# Patient Record
Sex: FEMALE | Race: WHITE | Employment: OTHER | URBAN - METROPOLITAN AREA
[De-identification: names, ages, dates, MRNs, and addresses within clinical notes are randomized per-mention and may not be internally consistent; named-entity substitution may affect disease eponyms.]

---

## 2017-05-03 ENCOUNTER — OFFICE VISIT (OUTPATIENT)
Dept: INTERNAL MEDICINE CLINIC | Age: 32
End: 2017-05-03

## 2017-05-03 VITALS
SYSTOLIC BLOOD PRESSURE: 118 MMHG | HEIGHT: 65 IN | RESPIRATION RATE: 16 BRPM | WEIGHT: 174.6 LBS | HEART RATE: 87 BPM | TEMPERATURE: 98.2 F | DIASTOLIC BLOOD PRESSURE: 86 MMHG | BODY MASS INDEX: 29.09 KG/M2 | OXYGEN SATURATION: 98 %

## 2017-05-03 DIAGNOSIS — M62.838 MUSCLE SPASM: ICD-10-CM

## 2017-05-03 DIAGNOSIS — M79.18 PAIN IN RIGHT BUTTOCK: Primary | ICD-10-CM

## 2017-05-03 RX ORDER — PREDNISONE 20 MG/1
TABLET ORAL
Qty: 10 TAB | Refills: 0 | Status: SHIPPED | OUTPATIENT
Start: 2017-05-03 | End: 2017-06-30 | Stop reason: ALTCHOICE

## 2017-05-03 RX ORDER — CYCLOBENZAPRINE HCL 10 MG
10 TABLET ORAL
Qty: 10 TAB | Refills: 0 | Status: SHIPPED | OUTPATIENT
Start: 2017-05-03 | End: 2017-05-13

## 2017-05-03 NOTE — PROGRESS NOTES
Chief Complaint   Patient presents with    Buttocks pain     states that the pain came from out of nowhere and she is going on a trip Saturday. states that it is constant starting in right buttock and goes down the right leg.

## 2017-05-03 NOTE — MR AVS SNAPSHOT
Visit Information Date & Time Provider Department Dept. Phone Encounter #  
 5/3/2017 10:30 AM Ramos Tucker MD [facility] Internal Medicine [phone] [id number] Upcoming Health Maintenance Date Due DTaP/Tdap/Td series (1 - Tdap) 12/29/2008 PAP AKA CERVICAL CYTOLOGY 7/24/2017 INFLUENZA AGE 9 TO ADULT 8/1/2017 Allergies as of 5/3/2017  Review Complete On: 5/3/2017 By: Marely Francis LPN Severity Noted Reaction Type Reactions Sulfa (Sulfonamide Antibiotics) Medium 08/11/2011   Systemic Hives Coconut  10/20/2011    Hives Current Immunizations  Reviewed on 3/5/2013 Name Date HPV (Quad) 3/5/2013 Human Papillomavirus 11/6/2012, 9/6/2012 Not reviewed this visit You Were Diagnosed With   
  
 Codes Comments Pain in right buttock    -  Primary ICD-10-CM: M79.1 ICD-9-CM: 729.1 Muscle spasm     ICD-10-CM: U03.363 ICD-9-CM: 728.85 Vitals BP Pulse Temp Resp Height(growth percentile) Weight(growth percentile) 118/86 (BP 1 Location: Right arm, BP Patient Position: Sitting) 87 98.2 °F (36.8 °C) (Oral) 16 5' 5\" (1.651 m) 174 lb 9.6 oz (79.2 kg) LMP SpO2 BMI OB Status Smoking Status 04/24/2017 98% 29.05 kg/m2 Having regular periods Former Smoker BMI and BSA Data Body Mass Index Body Surface Area 29.05 kg/m 2 1.91 m 2 Preferred Pharmacy Pharmacy Name Phone [facility] [address] [facility], [address] [facility] AT [address] [place] [address] [phone] Your Updated Medication List  
  
   
This list is accurate as of: 5/3/17 11:15 AM.  Always use your most recent med list.  
  
  
  
  
 cyclobenzaprine 10 mg tablet Commonly known as:  FLEXERIL Take 1 Tab by mouth nightly for 10 days. MULTIVITAMIN PO Take  by mouth.  
  
 predniSONE 20 mg tablet Commonly known as:  Laura Duane Prednisone 60 mg po x 1 days, 40 mg pox 2 days, 20 mg pox 2 days, 10 mg po x 1 day then stop. VITAMIN D3 2,000 unit Tab Generic drug:  cholecalciferol (vitamin D3) Take 1 Tab by mouth daily. Prescriptions Sent to Pharmacy Refills  
 predniSONE (DELTASONE) 20 mg tablet 0 Sig: Prednisone 60 mg po x 1 days, 40 mg pox 2 days, 20 mg pox 2 days, 10 mg po x 1 day then stop. Class: Normal  
 Pharmacy: [facility] [address] AT [address] [facility] Ph #: [phone]  
 cyclobenzaprine (FLEXERIL) 10 mg tablet 0 Sig: Take 1 Tab by mouth nightly for 10 days. Class: Normal  
 Pharmacy: [facility] [address] [facility], [address] [facility] [address] [place] [address] Ph #: [phone] Route: Oral  
  
Introducing \[facility]\"" & [facility] SERVICES! Dear Bin Fitting: Thank you for requesting a [employer] account. Our records indicate that you already have an active [employer] account. You can access your account anytime at [email or web address] Did you know that you can access your hospital and ER discharge instructions at any time in [employer]? You can also review all of your test results from your hospital stay or ER visit. Additional Information If you have questions, please visit the Frequently Asked Questions section of the [employer] website at [email or web address] Remember, [employer] is NOT to be used for urgent needs. For medical emergencies, dial 911. Now available from your iPhone and Android! Please provide this summary of care documentation to your next provider. Your primary care clinician is listed as Madalyn Boone. If you have any questions after today's visit, please call [phone].

## 2017-05-03 NOTE — PROGRESS NOTES
Written by Mardene Apgar, as dictated by Dr. Yasmine Cueva MD.    Sher Duarte is a 36 y.o. female. HPI  The patient comes in today C/O R buttock pain that shoots down her R leg x 10 days. She has pain when she bends over and she sits for longer periods of time. Her pain is the worst first thing in the morning. She has taken advil yesterday for this pain, but it did not help at all. She is alternating ice and heat on her buttock, which helps some. She is not sure how she hurt it. She did go fly-fishing 2 weeks ago and has noticed pain since. She is exercising so she is unsure if she hurt it when she was exercising. The pain is getting worse. She is getting concerned because she is going to [facility] next week  for her anniversary. Patient Active Problem List   Diagnosis Code    Groin cyst [id number]        Current Outpatient Prescriptions on File Prior to Visit   Medication Sig Dispense Refill    cholecalciferol, vitamin D3, (VITAMIN D3) 2,000 unit Tab Take 1 Tab by mouth daily.  MULTIVITAMIN PO Take  by mouth. No current facility-administered medications on file prior to visit.         Allergies   Allergen Reactions    Sulfa (Sulfonamide Antibiotics) Hives    Coconut Hives       Past Medical History:   Diagnosis Date    Asthma 10/20/11    CHILDHOOD; NO INHALER IN YEARS       Past Surgical History:   Procedure Laterality Date    HX CYST REMOVAL  2010    inner right thigh    HX CYST REMOVAL  10/2011    HX CYST REMOVAL  13    Excision recurrent cyst, right groin    HX OTHER SURGICAL  11/10    EXCISION R GROIN CYST X2       Family History   Problem Relation Age of Onset    Asthma Mother     Hypertension Father     Diabetes Maternal Grandfather     Diabetes Paternal Grandmother     Cancer Paternal Grandfather      BRAIN TUMOR    Cancer Other      FGR GRANDFATHER-LUNG CA    Heart Disease Neg Hx     Stroke Neg Hx     Malignant Hyperthermia Neg Hx     Pseudocholinesterase Deficiency Neg Hx     Delayed Awakening Neg Hx     Post-op Nausea/Vomiting Neg Hx        Social History     Social History    Marital status: SINGLE     Spouse name: N/A    Number of children: N/A    Years of education: N/A     Occupational History    Not on file. Social History Main Topics    Smoking status: Former Smoker     Packs/day: 0.25     Years: 0.50     Quit date: 8/11/2011    Smokeless tobacco: Never Used    Alcohol use Yes      Comment: one time a week    Drug use: No    Sexual activity: Yes     Partners: Male     Birth control/ protection: Pill     Other Topics Concern    Not on file     Social History Narrative           Review of Systems   Constitutional: Negative for malaise/fatigue. HENT: Negative for congestion. Respiratory: Negative for cough and wheezing. Cardiovascular: Negative for chest pain and palpitations. Musculoskeletal: Negative for joint pain and myalgias. Neurological: Negative for weakness and headaches. Visit Vitals    /86 (BP 1 Location: Right arm, BP Patient Position: Sitting)    Pulse 87    Temp 98.2 °F (36.8 °C) (Oral)    Resp 16    Ht 5' 5\" (1.651 m)    Wt 174 lb 9.6 oz (79.2 kg)    LMP 04/24/2017    SpO2 98%    BMI 29.05 kg/m2     Physical Exam   Constitutional: She is oriented to person, place, and time. She appears well-nourished. No distress. HENT:   Right Ear: External ear normal.   Left Ear: External ear normal.   Mouth/Throat: Oropharynx is clear and moist.   Eyes: Conjunctivae and EOM are normal.   Neck: Normal range of motion. Neck supple. Cardiovascular: Normal rate and regular rhythm. Pulmonary/Chest: Effort normal and breath sounds normal. She has no wheezes. Abdominal: Soft. Bowel sounds are normal.   Musculoskeletal:   R buttock tenderness, ROM painful on adduction  SLRT negative   Neurological: She is alert and oriented to person, place, and time.    Skin: Skin is intact. Psychiatric: She has a normal mood and affect. Nursing note and vitals reviewed. ASSESSMENT and PLAN    ICD-10-CM ICD-9-CM    1. Pain in right buttock M79.1 729.1 predniSONE (DELTASONE) 20 mg tablet sent to pharmacy      cyclobenzaprine (FLEXERIL) 10 mg tablet sent to pharmacy    I discussed that I think she sprained her buttock. I will start her on steroids. I want the patient to take the medication po as follows: 3 pills x 1 day, 2 pills x 2 days, 1 pill x 2 days and 1/2 pill x 1 day. The patient was advised to take this medication with food. 2. Muscle spasm M62.838 728. 85 predniSONE (DELTASONE) 20 mg tablet sent to pharmacy. cyclobenzaprine (FLEXERIL) 10 mg tablet sent to pharmacy. I will also start her on flexeril at night to help with her spasm. If this does not help, then I will order imaging and PT. This plan was reviewed with the patient and patient agrees. All questions were answered. This scribe documentation was reviewed by me and accurately reflects the examination and decisions made by me. This note will not be viewable in [address]

## 2017-06-30 ENCOUNTER — OFFICE VISIT (OUTPATIENT)
Dept: INTERNAL MEDICINE CLINIC | Age: 32
End: 2017-06-30

## 2017-06-30 VITALS
TEMPERATURE: 98.4 F | DIASTOLIC BLOOD PRESSURE: 74 MMHG | BODY MASS INDEX: 29.82 KG/M2 | HEART RATE: 83 BPM | WEIGHT: 179 LBS | RESPIRATION RATE: 14 BRPM | OXYGEN SATURATION: 99 % | SYSTOLIC BLOOD PRESSURE: 116 MMHG | HEIGHT: 65 IN

## 2017-06-30 DIAGNOSIS — L08.9 INFECTED SEBACEOUS CYST: Primary | ICD-10-CM

## 2017-06-30 DIAGNOSIS — L72.3 INFECTED SEBACEOUS CYST: Primary | ICD-10-CM

## 2017-06-30 RX ORDER — CLINDAMYCIN HYDROCHLORIDE 300 MG/1
300 CAPSULE ORAL 3 TIMES DAILY
Qty: 21 CAP | Refills: 0 | Status: SHIPPED | OUTPATIENT
Start: 2017-06-30 | End: 2017-07-07

## 2017-07-24 ENCOUNTER — OFFICE VISIT (OUTPATIENT)
Dept: SURGERY | Age: 32
End: 2017-07-24

## 2017-07-24 VITALS
WEIGHT: 174 LBS | SYSTOLIC BLOOD PRESSURE: 110 MMHG | OXYGEN SATURATION: 97 % | HEIGHT: 65 IN | DIASTOLIC BLOOD PRESSURE: 80 MMHG | RESPIRATION RATE: 18 BRPM | BODY MASS INDEX: 28.99 KG/M2 | HEART RATE: 77 BPM | TEMPERATURE: 98.5 F

## 2017-07-24 DIAGNOSIS — R10.31 RIGHT GROIN PAIN: Primary | ICD-10-CM

## 2017-07-24 RX ORDER — AMOXICILLIN AND CLAVULANATE POTASSIUM 875; 125 MG/1; MG/1
1 TABLET, FILM COATED ORAL 2 TIMES DAILY
Qty: 10 TAB | Refills: 0 | Status: SHIPPED | OUTPATIENT
Start: 2017-07-24 | End: 2017-07-29

## 2017-07-24 NOTE — PROGRESS NOTES
1. Have you been to the ER, urgent care clinic since your last visit? Hospitalized since your last visit? Yes,[place] teeth out.7/11/17. 2. Have you seen or consulted any other health care providers outside of the [address] [place] [address] since your last visit? Include any pap smears or colon screening.  no

## 2017-07-24 NOTE — MR AVS SNAPSHOT
Visit Information Date & Time Provider Department Dept. Phone Encounter #  
 7/24/2017 11:40 AM MD Rj Sutherland [phone] [id number] Upcoming Health Maintenance Date Due DTaP/Tdap/Td series (1 - Tdap) 12/29/2008 PAP AKA CERVICAL CYTOLOGY 7/24/2017 INFLUENZA AGE 9 TO ADULT 8/1/2017 Allergies as of 7/24/2017  Review Complete On: 7/24/2017 By: Zora David LPN Severity Noted Reaction Type Reactions Sulfa (Sulfonamide Antibiotics) Medium 08/11/2011   Systemic Hives Coconut  10/20/2011    Hives Current Immunizations  Reviewed on 3/5/2013 Name Date HPV (Quad) 3/5/2013 Human Papillomavirus 11/6/2012, 9/6/2012 Not reviewed this visit Vitals BP Pulse Temp Resp Height(growth percentile) Weight(growth percentile) 110/80 77 98.5 °F (36.9 °C) (Oral) 18 5' 5\" (1.651 m) 174 lb (78.9 kg) LMP SpO2 BMI OB Status Smoking Status (LMP Unknown) 97% 28.96 kg/m2 IUD Former Smoker BMI and BSA Data Body Mass Index Body Surface Area  
 28.96 kg/m 2 1.9 m 2 Preferred Pharmacy Pharmacy Name Phone [facility] [address] [facility], [address] [place] [address] AT [address] [place] [address] [phone] Your Updated Medication List  
  
   
This list is accurate as of: 7/24/17 12:08 PM.  Always use your most recent med list.  
  
  
  
  
 MIRENA 20 mcg/24 hr (5 years) IUD Generic drug:  levonorgestrel 1 Each by IntraUTERine route once. Due for removal 3/2022 MULTIVITAMIN PO Take  by mouth. VITAMIN D3 2,000 unit Tab Generic drug:  cholecalciferol (vitamin D3) Take 1 Tab by mouth daily. Introducing [facility] & HEALTH SERVICES! Dear Dori Rendon: Thank you for requesting a [employer] account. Our records indicate that you already have an active [employer] account. You can access your account anytime at [email or web address] Did you know that you can access your hospital and ER discharge instructions at any time in [employer]? You can also review all of your test results from your hospital stay or ER visit. Additional Information If you have questions, please visit the Frequently Asked Questions section of the [employer] website at [email or web address] Remember, [employer] is NOT to be used for urgent needs. For medical emergencies, dial 911. Now available from your iPhone and Android! Please provide this summary of care documentation to your next provider. Your primary care clinician is listed as Pam Oliveira. If you have any questions after today's visit, please call [phone].

## 2017-07-24 NOTE — PROGRESS NOTES
HISTORY OF PRESENT ILLNESS  Tova Van is a 36 y.o. female who returns for evaluation of right groin pain. HPI Comments: Ms. Preeti Bar tells me that she began experiencing pain in her right groin approx. 3 weeks ago. She subsequently developed a mass which has become larger and more bothersome to her. No associated drainage. Of note, Ms. Camacho is s/p excision of a chronic wound from her right groin in 8/2013 - the pain and mass are localized to the same area. She has otherwise been in her usual state of health. Past Medical History:  10/20/11: Asthma      Comment: CHILDHOOD; NO INHALER IN YEARS  7/24/2017: Right groin pain    Past Surgical History:  11/01/2010: HX CYST REMOVAL      Comment: inner right thigh  10/2011: HX CYST REMOVAL  8/16/13: HX CYST REMOVAL      Comment: Excision recurrent cyst, right groin  11/10: HX OTHER SURGICAL      Comment: EXCISION R GROIN CYST X2  07/11/2017: HX WISDOM TEETH EXTRACTION    Review of patient's family history indicates:    Asthma                         Mother                    Hypertension                   Father                    Diabetes                       Maternal Grandfather      Diabetes                       Paternal Grandmother      Cancer                         Paternal Grandfather        Comment: BRAIN TUMOR    Cancer                         Other                       Comment: FGR GRANDFATHER-LUNG CA    Heart Disease                  Neg Hx                    Stroke                         Neg Hx                    Malignant Hyperthermia         Neg Hx                    Pseudocholinesterase Deficien* Neg Hx                    Delayed Awakening              Neg Hx                    Post-op Nausea/Vomiting        Neg Hx                    Social History: Employment - Self Employed. Tobacco - Quit. EtOH - 2-4 drinks per week. Review of systems negative except as noted.       Review of Systems   Constitutional: Negative for chills and fever. Gastrointestinal: Negative for nausea and vomiting. Musculoskeletal:        Pain in right groin. Physical Exam   Constitutional: She appears well-developed and well-nourished. No distress. HENT:   Head: Normocephalic and atraumatic. Eyes: No scleral icterus. Neck: Neck supple. Cardiovascular: Normal rate and regular rhythm. Pulmonary/Chest: Effort normal and breath sounds normal.   Abdominal: Soft. She exhibits no distension. There is no tenderness. There is no rebound and no guarding. Musculoskeletal: Normal range of motion. Lymphadenopathy:     She has no cervical adenopathy. Neurological: She is alert. Skin:   Well healed scar in right groin. At the lateral aspect of the scar there is a tender area. Minimal erythema. No associated drainage. Vitals reviewed. ASSESSMENT and PLAN  At this point in time, do not believe that there is an indication for surgical intervention. Suggested that she try warm compresses to the area. Also, Rx for Augmentin 875mg BID for 5 days. Will see in one more week or earlier if need be. She is agreeable to this plan and is most certainly free to contact the office should any questions or concerns arise.      CC: Juan Hooper MD

## 2017-08-07 ENCOUNTER — OFFICE VISIT (OUTPATIENT)
Dept: SURGERY | Age: 32
End: 2017-08-07

## 2017-08-07 VITALS
WEIGHT: 176.5 LBS | DIASTOLIC BLOOD PRESSURE: 70 MMHG | TEMPERATURE: 98.7 F | RESPIRATION RATE: 20 BRPM | SYSTOLIC BLOOD PRESSURE: 112 MMHG | BODY MASS INDEX: 29.41 KG/M2 | HEIGHT: 65 IN | HEART RATE: 88 BPM | OXYGEN SATURATION: 98 %

## 2017-08-07 DIAGNOSIS — L98.9 SKIN LESION OF RIGHT LOWER EXTREMITY: Primary | ICD-10-CM

## 2017-08-07 NOTE — PROGRESS NOTES
Chief Complaint   Patient presents with    Groin Pain     Right-followup     Patient remains with right groin pain, completed course of  Augmentin 875 mg BID x5 days, she has been doing warm compresses as instructed. 1. Have you been to the ER, urgent care clinic since your last visit? Hospitalized since your last visit? No     2. Have you seen or consulted any other health care providers outside of the [address] [place] [address] since your last visit? Include any pap smears or colon screening.  No

## 2017-08-07 NOTE — PROGRESS NOTES
HISTORY OF PRESENT ILLNESS  Nehemiah Collins is a 36 y.o. female who returns for follow up of right groin pain. HPI Comments: Ms. Keri Cordova was seen on 7/24/2017 for evaluation of right groin pain and an associated area of tenderness. She was prescribed a 5 day course of Augmentin 875mg BID. No significant improvement. No new complaints today. She has otherwise been in her usual state of health.      Past Medical History:  10/20/11: Asthma      Comment: CHILDHOOD; NO INHALER IN YEARS  7/24/2017: Right groin pain  8/7/2017: Skin lesion of right lower extremity    Past Surgical History:  11/01/2010: HX CYST REMOVAL      Comment: inner right thigh  10/2011: HX CYST REMOVAL  8/16/13: HX CYST REMOVAL      Comment: Excision recurrent cyst, right groin  11/10: HX OTHER SURGICAL      Comment: EXCISION R GROIN CYST X2  07/11/2017: HX WISDOM TEETH EXTRACTION    Review of patient's family history indicates:    Asthma                         Mother                    Hypertension                   Father                    Diabetes                       Maternal Grandfather      Diabetes                       Paternal Grandmother      Cancer                         Paternal Grandfather        Comment: BRAIN TUMOR    Cancer                         Other                       Comment: FGR GRANDFATHER-LUNG CA    Heart Disease                  Neg Hx                    Stroke                         Neg Hx                    Malignant Hyperthermia         Neg Hx                    Pseudocholinesterase Deficien* Neg Hx                    Delayed Awakening              Neg Hx                    Post-op Nausea/Vomiting        Neg Hx                    Social History    Marital status: SINGLE              Spouse name:                       Years of education:                 Number of children:               Social History Main Topics    Smoking status: Former Smoker Packs/day: 0.25      Years: 0.50           Quit date: 8/11/2011    Smokeless status: Never Used                        Alcohol use: Yes                Comment: one time a week    Drug use: No              Sexual activity: Yes               Partners with: Male       Birth control/protection: Pill    Review of systems negative except as noted. Review of Systems   Constitutional: Negative for chills and fever. Gastrointestinal: Negative for nausea and vomiting. Musculoskeletal:        Right groin pain. Physical Exam   Constitutional: She appears well-developed and well-nourished. No distress. Cardiovascular: Normal rate and regular rhythm. Pulmonary/Chest: Effort normal and breath sounds normal.   Abdominal: Soft. She exhibits no distension. There is no tenderness. Musculoskeletal: Normal range of motion. Neurological: She is alert. Skin:   Well healed scar right groin. At lateral aspect of scar there is a raised skin lesion. Associated tenderness. No drainage. Vitals reviewed. ASSESSMENT and PLAN  In view of the findings on H and P, Ms. Camacho should benefit from excision of the right groin skin lesion as it is bothersome to her. Discussed procedure with her including risks of bleeding, infection, need for further surgery, recurrence. She understands and wishes to proceed. I have tentatively scheduled Ms. Camacho for surgery on August 23, 2017 at [facility] and will see her back in the office postoperatively. She is agreeable to this plan of action and is most certainly free to contact the office should any questions or concerns arise.        CC: Antony Thomas MD

## 2017-08-15 ENCOUNTER — APPOINTMENT (OUTPATIENT)
Dept: PHYSICAL THERAPY | Age: 32
End: 2017-08-15

## 2017-08-16 RX ORDER — BUPIVACAINE HYDROCHLORIDE 2.5 MG/ML
30 INJECTION, SOLUTION EPIDURAL; INFILTRATION; INTRACAUDAL ONCE
Status: CANCELLED | OUTPATIENT
Start: 2017-08-16 | End: 2017-08-16

## 2017-08-22 ENCOUNTER — ANESTHESIA EVENT (OUTPATIENT)
Dept: MEDSURG UNIT | Age: 32
End: 2017-08-22
Payer: COMMERCIAL

## 2017-08-23 ENCOUNTER — HOSPITAL ENCOUNTER (OUTPATIENT)
Age: 32
Setting detail: OUTPATIENT SURGERY
Discharge: HOME | End: 2017-08-23
Attending: SURGERY | Admitting: SURGERY
Payer: COMMERCIAL

## 2017-08-23 ENCOUNTER — ANESTHESIA (OUTPATIENT)
Dept: MEDSURG UNIT | Age: 32
End: 2017-08-23
Payer: COMMERCIAL

## 2017-08-23 VITALS
SYSTOLIC BLOOD PRESSURE: 119 MMHG | BODY MASS INDEX: 28.1 KG/M2 | HEIGHT: 65 IN | HEART RATE: 76 BPM | DIASTOLIC BLOOD PRESSURE: 78 MMHG | WEIGHT: 168.65 LBS | OXYGEN SATURATION: 99 % | RESPIRATION RATE: 12 BRPM | TEMPERATURE: 98.4 F

## 2017-08-23 LAB
DAILY QC (YES/NO)?: YES
HCG UR QL: NEGATIVE
HGB BLD-MCNC: 12.5 G/DL (ref 11.5–16)

## 2017-08-23 PROCEDURE — 74011000250 HC RX REV CODE- 250

## 2017-08-23 PROCEDURE — 81025 URINE PREGNANCY TEST: CPT

## 2017-08-23 PROCEDURE — 77030020782 HC GWN BAIR PAWS FLX 3M -B

## 2017-08-23 PROCEDURE — 85018 HEMOGLOBIN: CPT | Performed by: ANESTHESIOLOGY

## 2017-08-23 PROCEDURE — 77030018836 HC SOL IRR NACL ICUM -A: Performed by: SURGERY

## 2017-08-23 PROCEDURE — 77030031139 HC SUT VCRL2 J&J -A: Performed by: SURGERY

## 2017-08-23 PROCEDURE — 74011000250 HC RX REV CODE- 250: Performed by: SURGERY

## 2017-08-23 PROCEDURE — 74011250636 HC RX REV CODE- 250/636

## 2017-08-23 PROCEDURE — 74011250636 HC RX REV CODE- 250/636: Performed by: ANESTHESIOLOGY

## 2017-08-23 PROCEDURE — 77030010509 HC AIRWY LMA MSK TELE -A: Performed by: ANESTHESIOLOGY

## 2017-08-23 PROCEDURE — 77030002933 HC SUT MCRYL J&J -A: Performed by: SURGERY

## 2017-08-23 PROCEDURE — 76210000046 HC AMBSU PH II REC FIRST 0.5 HR: Performed by: SURGERY

## 2017-08-23 PROCEDURE — 77030011640 HC PAD GRND REM COVD -A: Performed by: SURGERY

## 2017-08-23 PROCEDURE — 77030032490 HC SLV COMPR SCD KNE COVD -B: Performed by: SURGERY

## 2017-08-23 PROCEDURE — 88304 TISSUE EXAM BY PATHOLOGIST: CPT | Performed by: SURGERY

## 2017-08-23 PROCEDURE — 76030000001 HC AMB SURG OR TIME 1 TO 1.5: Performed by: SURGERY

## 2017-08-23 PROCEDURE — 76210000035 HC AMBSU PH I REC 1 TO 1.5 HR: Performed by: SURGERY

## 2017-08-23 PROCEDURE — 74011250636 HC RX REV CODE- 250/636: Performed by: SURGERY

## 2017-08-23 PROCEDURE — 77030010507 HC ADH SKN DERMBND J&J -B: Performed by: SURGERY

## 2017-08-23 PROCEDURE — 76060000062 HC AMB SURG ANES 1 TO 1.5 HR: Performed by: SURGERY

## 2017-08-23 RX ORDER — LIDOCAINE HYDROCHLORIDE 20 MG/ML
INJECTION, SOLUTION EPIDURAL; INFILTRATION; INTRACAUDAL; PERINEURAL AS NEEDED
Status: DISCONTINUED | OUTPATIENT
Start: 2017-08-23 | End: 2017-08-23 | Stop reason: HOSPADM

## 2017-08-23 RX ORDER — MIDAZOLAM HYDROCHLORIDE 1 MG/ML
INJECTION, SOLUTION INTRAMUSCULAR; INTRAVENOUS AS NEEDED
Status: DISCONTINUED | OUTPATIENT
Start: 2017-08-23 | End: 2017-08-23 | Stop reason: HOSPADM

## 2017-08-23 RX ORDER — KETOROLAC TROMETHAMINE 30 MG/ML
INJECTION, SOLUTION INTRAMUSCULAR; INTRAVENOUS AS NEEDED
Status: DISCONTINUED | OUTPATIENT
Start: 2017-08-23 | End: 2017-08-23 | Stop reason: HOSPADM

## 2017-08-23 RX ORDER — SODIUM CHLORIDE 0.9 % (FLUSH) 0.9 %
5-10 SYRINGE (ML) INJECTION EVERY 8 HOURS
Status: DISCONTINUED | OUTPATIENT
Start: 2017-08-23 | End: 2017-08-23 | Stop reason: HOSPADM

## 2017-08-23 RX ORDER — PROPOFOL 10 MG/ML
INJECTION, EMULSION INTRAVENOUS AS NEEDED
Status: DISCONTINUED | OUTPATIENT
Start: 2017-08-23 | End: 2017-08-23 | Stop reason: HOSPADM

## 2017-08-23 RX ORDER — ONDANSETRON 2 MG/ML
INJECTION INTRAMUSCULAR; INTRAVENOUS AS NEEDED
Status: DISCONTINUED | OUTPATIENT
Start: 2017-08-23 | End: 2017-08-23 | Stop reason: HOSPADM

## 2017-08-23 RX ORDER — ONDANSETRON 2 MG/ML
4 INJECTION INTRAMUSCULAR; INTRAVENOUS AS NEEDED
Status: DISCONTINUED | OUTPATIENT
Start: 2017-08-23 | End: 2017-08-23 | Stop reason: HOSPADM

## 2017-08-23 RX ORDER — FENTANYL CITRATE 50 UG/ML
25 INJECTION, SOLUTION INTRAMUSCULAR; INTRAVENOUS
Status: DISCONTINUED | OUTPATIENT
Start: 2017-08-23 | End: 2017-08-23 | Stop reason: HOSPADM

## 2017-08-23 RX ORDER — SODIUM CHLORIDE, SODIUM LACTATE, POTASSIUM CHLORIDE, CALCIUM CHLORIDE 600; 310; 30; 20 MG/100ML; MG/100ML; MG/100ML; MG/100ML
INJECTION, SOLUTION INTRAVENOUS
Status: DISCONTINUED | OUTPATIENT
Start: 2017-08-23 | End: 2017-08-23 | Stop reason: HOSPADM

## 2017-08-23 RX ORDER — LIDOCAINE HYDROCHLORIDE 10 MG/ML
0.1 INJECTION, SOLUTION EPIDURAL; INFILTRATION; INTRACAUDAL; PERINEURAL AS NEEDED
Status: DISCONTINUED | OUTPATIENT
Start: 2017-08-23 | End: 2017-08-23 | Stop reason: HOSPADM

## 2017-08-23 RX ORDER — SODIUM CHLORIDE 0.9 % (FLUSH) 0.9 %
5-10 SYRINGE (ML) INJECTION AS NEEDED
Status: DISCONTINUED | OUTPATIENT
Start: 2017-08-23 | End: 2017-08-23 | Stop reason: HOSPADM

## 2017-08-23 RX ORDER — FENTANYL CITRATE 50 UG/ML
INJECTION, SOLUTION INTRAMUSCULAR; INTRAVENOUS AS NEEDED
Status: DISCONTINUED | OUTPATIENT
Start: 2017-08-23 | End: 2017-08-23 | Stop reason: HOSPADM

## 2017-08-23 RX ORDER — HYDROCODONE BITARTRATE AND ACETAMINOPHEN 5; 325 MG/1; MG/1
1 TABLET ORAL
Qty: 6 TAB | Refills: 0 | Status: SHIPPED | OUTPATIENT
Start: 2017-08-23 | End: 2017-09-06 | Stop reason: ALTCHOICE

## 2017-08-23 RX ORDER — CEFAZOLIN SODIUM IN 0.9 % NACL 2 G/50 ML
2 INTRAVENOUS SOLUTION, PIGGYBACK (ML) INTRAVENOUS
Status: COMPLETED | OUTPATIENT
Start: 2017-08-23 | End: 2017-08-23

## 2017-08-23 RX ORDER — MORPHINE SULFATE 10 MG/ML
2 INJECTION, SOLUTION INTRAMUSCULAR; INTRAVENOUS
Status: DISCONTINUED | OUTPATIENT
Start: 2017-08-23 | End: 2017-08-23 | Stop reason: HOSPADM

## 2017-08-23 RX ORDER — BUPIVACAINE HYDROCHLORIDE 2.5 MG/ML
30 INJECTION, SOLUTION EPIDURAL; INFILTRATION; INTRACAUDAL ONCE
Status: COMPLETED | OUTPATIENT
Start: 2017-08-23 | End: 2017-08-23

## 2017-08-23 RX ORDER — DEXAMETHASONE SODIUM PHOSPHATE 4 MG/ML
INJECTION, SOLUTION INTRA-ARTICULAR; INTRALESIONAL; INTRAMUSCULAR; INTRAVENOUS; SOFT TISSUE AS NEEDED
Status: DISCONTINUED | OUTPATIENT
Start: 2017-08-23 | End: 2017-08-23 | Stop reason: HOSPADM

## 2017-08-23 RX ORDER — SODIUM CHLORIDE, SODIUM LACTATE, POTASSIUM CHLORIDE, CALCIUM CHLORIDE 600; 310; 30; 20 MG/100ML; MG/100ML; MG/100ML; MG/100ML
50 INJECTION, SOLUTION INTRAVENOUS CONTINUOUS
Status: DISCONTINUED | OUTPATIENT
Start: 2017-08-23 | End: 2017-08-23 | Stop reason: HOSPADM

## 2017-08-23 RX ORDER — HYDROMORPHONE HYDROCHLORIDE 1 MG/ML
0.2 INJECTION, SOLUTION INTRAMUSCULAR; INTRAVENOUS; SUBCUTANEOUS
Status: DISCONTINUED | OUTPATIENT
Start: 2017-08-23 | End: 2017-08-23 | Stop reason: HOSPADM

## 2017-08-23 RX ADMIN — FENTANYL CITRATE 50 MCG: 50 INJECTION, SOLUTION INTRAMUSCULAR; INTRAVENOUS at 13:33

## 2017-08-23 RX ADMIN — MIDAZOLAM HYDROCHLORIDE 2 MG: 1 INJECTION, SOLUTION INTRAMUSCULAR; INTRAVENOUS at 13:12

## 2017-08-23 RX ADMIN — CEFAZOLIN 2 G: 1 INJECTION, POWDER, FOR SOLUTION INTRAMUSCULAR; INTRAVENOUS; PARENTERAL at 13:25

## 2017-08-23 RX ADMIN — ONDANSETRON 4 MG: 2 INJECTION INTRAMUSCULAR; INTRAVENOUS at 13:21

## 2017-08-23 RX ADMIN — SODIUM CHLORIDE, SODIUM LACTATE, POTASSIUM CHLORIDE, AND CALCIUM CHLORIDE 50 ML/HR: 600; 310; 30; 20 INJECTION, SOLUTION INTRAVENOUS at 11:06

## 2017-08-23 RX ADMIN — KETOROLAC TROMETHAMINE 30 MG: 30 INJECTION, SOLUTION INTRAMUSCULAR; INTRAVENOUS at 13:47

## 2017-08-23 RX ADMIN — DEXAMETHASONE SODIUM PHOSPHATE 4 MG: 4 INJECTION, SOLUTION INTRA-ARTICULAR; INTRALESIONAL; INTRAMUSCULAR; INTRAVENOUS; SOFT TISSUE at 13:21

## 2017-08-23 RX ADMIN — PROPOFOL 200 MG: 10 INJECTION, EMULSION INTRAVENOUS at 13:21

## 2017-08-23 RX ADMIN — SODIUM CHLORIDE, SODIUM LACTATE, POTASSIUM CHLORIDE, CALCIUM CHLORIDE: 600; 310; 30; 20 INJECTION, SOLUTION INTRAVENOUS at 13:17

## 2017-08-23 RX ADMIN — LIDOCAINE HYDROCHLORIDE 30 MG: 20 INJECTION, SOLUTION EPIDURAL; INFILTRATION; INTRACAUDAL; PERINEURAL at 13:21

## 2017-08-23 RX ADMIN — FENTANYL CITRATE 25 MCG: 50 INJECTION, SOLUTION INTRAMUSCULAR; INTRAVENOUS at 13:21

## 2017-08-23 RX ADMIN — FENTANYL CITRATE 25 MCG: 50 INJECTION, SOLUTION INTRAMUSCULAR; INTRAVENOUS at 14:03

## 2017-08-23 NOTE — ANESTHESIA POSTPROCEDURE EVALUATION
Post-Anesthesia Evaluation and Assessment    Patient: Maryse Andrew MRN: [id number]  SSN: [id number]    YOB: 1985  Age: 36 y.o. Sex: female       Cardiovascular Function/Vital Signs  Visit Vitals    /68    Pulse 64    Temp 36.9 °C (98.4 °F)    Resp (!) 6    Ht 5' 5\" (1.651 m)    Wt 76.5 kg (168 lb 10.4 oz)    SpO2 96%    BMI 28.07 kg/m2       Patient is status post general anesthesia for Procedure(s):  EXCISION SKIN LESION RIGHT INNER THIGH. Nausea/Vomiting: None    Postoperative hydration reviewed and adequate. Pain:  Pain Scale 1: Numeric (0 - 10) (08/23/17 1419)  Pain Intensity 1: 0 (08/23/17 1419)   Managed    Neurological Status:   Neuro (WDL): Within Defined Limits (08/23/17 1419)   At baseline    Mental Status and Level of Consciousness: Arousable    Pulmonary Status:   O2 Device: Nasal cannula (08/23/17 1422)   Adequate oxygenation and airway patent    Complications related to anesthesia: None    Post-anesthesia assessment completed.  No concerns    Signed By: Saintclair Gaw, MD     August 23, 2017

## 2017-08-23 NOTE — ANESTHESIA PREPROCEDURE EVALUATION
Anesthetic History   No history of anesthetic complications            Review of Systems / Medical History  Patient summary reviewed, nursing notes reviewed and pertinent labs reviewed    Pulmonary            Asthma : well controlled       Neuro/Psych   Within defined limits           Cardiovascular                  Exercise tolerance: >4 METS     GI/Hepatic/Renal  Within defined limits              Endo/Other  Within defined limits           Other Findings              Physical Exam    Airway  Mallampati: I  TM Distance: > 6 cm  Neck ROM: normal range of motion   Mouth opening: Normal     Cardiovascular    Rhythm: regular  Rate: normal         Dental  No notable dental hx       Pulmonary  Breath sounds clear to auscultation               Abdominal         Other Findings            Anesthetic Plan    ASA: 2  Anesthesia type: general          Induction: Intravenous  Anesthetic plan and risks discussed with: Patient

## 2017-08-23 NOTE — OP NOTES
[address] [facility], [address]   OP NOTE       Name:  Brad Nair   MR#:  [id number]   :  1985   Account #:  [de-identified]    Surgery Date:  2017   Date of Adm:  2017       PREOPERATIVE DIAGNOSIS: Skin lesion, right inner thigh. POSTOPERATIVE DIAGNOSIS: Skin lesion, right inner thigh. PROCEDURES PERFORMED: Skin lesion, right inner thigh. SURGEON: Maria T Borrego. Tejas Zuleta MD.    ANESTHESIA: General via laryngeal mask airway. ESTIMATED BLOOD LOSS: Minimal.    FLUIDS: Crystalloid 700 mL. SPECIMENS REMOVED: Skin lesion, right inner thigh, to Pathology. DRAINS: None. COMPLICATIONS: None. INDICATIONS FOR SURGERY: The patient is a 72-year-old female   with a raised skin lesion on her right inner thigh. This is bothersome to   her and so she is brought to the operating room at this time for   excision. The risks of the procedure, including but not limited to   infection, bleeding, the need for further surgery and recurrence were   discussed in detail with the patient. All were understood and she   wished to proceed. DESCRIPTION OF PROCEDURE: After consent was obtained, the   patient was brought to the operating room where she was placed in the   supine position on the operating room table. Following the induction of   an adequate level of general anesthesia via laryngeal mask airway,   compression devices were placed on both lower extremities. The legs   were frog-legged and the right inner thigh prepped with Betadine and   draped as a sterile field. Local anesthetic was infiltrated and an   elliptical incision encompassing the skin lesion was opened sharply. Subcutaneous bleeders were carefully cauterized. The skin and   subcutaneous tissues were excised, passed off the field and submitted   for histopathologic evaluation. The excised area measured   approximately 2 x 6 cm. Several bleeders were made hemostatic with   the Bovie.  The skin edges were mobilized such that a primary tension-  free closure could be completed. The wound was irrigated again with   saline, inspected and found to be hemostatic. Surgical incision was   closed with interrupted 2-0 Vicryl sutures followed by 4-0 Monocryl   subcuticular suture to the skin. Additional local anesthetic was   infiltrated and pressure held. The incision was dressed with   Dermabond. The patient was returned to the supine position on the   operating room table. She was awakened from her general anesthetic   and the laryngeal mask airway removed. She was transferred from the   operating room table to the stretcher and brought to the recovery room   in stable condition, having tolerated the procedure well. At the   conclusion of the procedure, all sponge counts, instrument counts, and   needle counts were reported as correct x2.         Sarah Haji MD      [facility] / [facility]   D:  08/23/2017   14:22   T:  08/23/2017   15:34   Job #:  [id number]

## 2017-08-23 NOTE — IP AVS SNAPSHOT
[address] 
[phone] Patient: Marylen Ogles MRN: [id number] :1985 You are allergic to the following Allergen Reactions Sulfa (Sulfonamide Antibiotics) Hives Coconut Hives Recent Documentation Height Weight BMI OB Status Smoking Status 1.651 m 76.5 kg 28.07 kg/m2 Unknown Former Smoker Emergency Contacts Name Discharge Info Relation Home Work Mobile SharonreginaMikhail DISCHARGE CAREGIVER [3] Father [15] [phone] Doug Esposito DISCHARGE CAREGIVER [3] Spouse [3] [phone] About your hospitalization You were admitted on:  2017 You last received care in the:  [facility] PACU You were discharged on:  2017 Unit phone number:  [phone] Why you were hospitalized Your primary diagnosis was:  Not on File Providers Seen During Your Hospitalizations Provider Role Specialty Primary office phone Krissy Novak MD Attending Provider General Surgery [phone] Your Primary Care Physician (PCP) Primary Care Physician Office Phone Office Fax 1400 [facility] [address] [place] [address] [phone] Follow-up Information Follow up With Details Comments Contact Info Valeria Cole MD   [facility] [address] [place] [address] 
[phone] Your Appointments 2017  9:40 AM EDT  
POST OP 10 MIN with Michelle Catalan NP  
[facility] 22 406 ([facility]) [address] [place] [address] [facility] [phone] Current Discharge Medication List  
  
START taking these medications Dose & Instructions Dispensing Information Comments Morning Noon Evening Bedtime HYDROcodone-acetaminophen 5-325 mg per tablet Commonly known as:  Genevieve Orellana  
   
 Your last dose was: Your next dose is:    
   
   
 Dose:  1 Tab Take 1 Tab by mouth every four (4) hours as needed for Pain. Max Daily Amount: 6 Tabs. Quantity:  6 Tab Refills:  0 CONTINUE these medications which have NOT CHANGED Dose & Instructions Dispensing Information Comments Morning Noon Evening Bedtime MULTIVITAMIN PO Your last dose was: Your next dose is: Take  by mouth. Refills:  0  
     
   
   
   
  
 TRINESSA LO PO Your last dose was: Your next dose is:    
   
   
 Dose:  1 Tab Take 1 Tab by mouth daily. Refills:  0  
     
   
   
   
  
 VITAMIN D3 2,000 unit Tab Generic drug:  cholecalciferol (vitamin D3) Your last dose was: Your next dose is:    
   
   
 Dose:  1 Tab Take 1 Tab by mouth daily. Refills:  0 Where to Get Your Medications Information on where to get these meds will be given to you by the nurse or doctor. ! Ask your nurse or doctor about these medications HYDROcodone-acetaminophen 5-325 mg per tablet Discharge Instructions Patient Discharge Instructions Nickie Priscilla / [id number] : 1985 Admitted 2017 Discharged: 2017 · It is important that you take the medication exactly as they are prescribed. · Keep your medication in the bottles provided by the pharmacist and keep a list of the medication names, dosages, and times to be taken in your wallet. · Do not take other medications without consulting your doctor. What to do at [facility] Recommended diet: Regular. Recommended activity: No Restrictions. No Driving While Taking [address]. May Take Shower or [place] Roxo. If you experience any of the following symptoms Fevers, Chills, Nausea, Vomitting, Redness or Drainage at Surgical Site(s) or Any Other Questions or Concerns Please Call -  [phone]. Follow-up with Dr. Jorge Villatoro in 10-14 days. Information obtained by : 
I understand that if any problems occur once I am at home I am to contact my physician. I understand and acknowledge receipt of the instructions indicated above. Physician's or R.N.'s Signature                                                                  Date/Time Patient or Representative Signature                                                          Date/Time DISCHARGE SUMMARY from Nurse 1) Toradol was given to you at 2:45 pm. This medication is an anti-inflammatory and is in the same category as Advil/Ibuprofen/Motrin. Do not take any of these medications until after 10:45 pm if needed. 2) Ancef which is an antibiotic was also given to you. Do not take any additional antibiotics if ordered until tomorrow. 2) Do not take any Tylenol while taking your pain pills as each pill contains 325 mg of Tylenol. Maximum amount of Tylenol for an adult should not exceed 4000 mg. Too much Tylenol can damage your liver. The following personal items are in your possession at time of discharge: 
 
Dental Appliances: None Visual Aid: None Jewelry: Body Piercing (lt ear-taped) Clothing:  (belongings bag) PATIENT INSTRUCTIONS: 
 
 
F-face looks uneven A-arms unable to move or move unevenly S-speech slurred or non-existent T-time-call 911 as soon as signs and symptoms begin-DO NOT go Back to bed or wait to see if you get better-TIME IS BRAIN. Warning Signs of HEART ATTACK Call 911 if you have these symptoms: 
? Chest discomfort. Most heart attacks involve discomfort in the center of the chest that lasts more than a few minutes, or that goes away and comes back. It can feel like uncomfortable pressure, squeezing, fullness, or pain. ? Discomfort in other areas of the upper body. Symptoms can include pain or discomfort in one or both arms, the back, neck, jaw, or stomach. ? Shortness of breath with or without chest discomfort. ? Other signs may include breaking out in a cold sweat, nausea, or lightheadedness. Don't wait more than five minutes to call [address]! Fast action can save your life. Calling 911 is almost always the fastest way to get lifesaving treatment. Emergency Medical Services staff can begin treatment when they arrive  up to an hour sooner than if someone gets to the hospital by car. The discharge information has been reviewed with the patient and spouse. The patient and spouse verbalized understanding. Discharge medications reviewed with the patient and spouse and appropriate educational materials and side effects teaching were provided. Discharge Orders None Introducing [facility]! Dear [employer]: Thank you for requesting a [employer] account. Our records indicate that you already have an active [employer] account. You can access your account anytime at [email or web address] Did you know that you can access your hospital and ER discharge instructions at any time in [employer]? You can also review all of your test results from your hospital stay or ER visit. Additional Information If you have questions, please visit the Frequently Asked Questions section of the [employer] website at [email or web address] Remember, MyChart is NOT to be used for urgent needs. For medical emergencies, dial 911. Now available from your iPhone and Android! General Information Please provide this summary of care documentation to your next provider. Patient Signature:  ____________________________________________________________ Date:  ____________________________________________________________  
  
Yves Snipe Provider Signature:  ____________________________________________________________ Date:  ____________________________________________________________

## 2017-08-23 NOTE — H&P
Date of Surgery Update:  Dash Winters was seen and examined. History and physical has been reviewed. The patient has been examined. There have been no significant clinical changes since the completion of the originally dated History and Physical.  Patient identified by surgeon; surgical site was confirmed by patient and surgeon. Signed By: Janis Becerra MD     August 23, 2017 1:07 PM         Please note from the office and include the additional information below:    Past Medical History  Past Medical History:   Diagnosis Date    Adverse effect of anesthesia     slow to awake. slow to function    Asthma 10/20/11    CHILDHOOD; NO INHALER IN YEARS    Right groin pain 7/24/2017    Skin lesion of right lower extremity 8/7/2017        Past Surgical History  Past Surgical History:   Procedure Laterality Date    HX CYST REMOVAL  11/01/2010    inner right thigh    HX CYST REMOVAL  10/2011    HX CYST REMOVAL  8/16/13    Excision recurrent cyst, right groin    HX OTHER SURGICAL  11/10    EXCISION R GROIN CYST X2    HX OTHER SURGICAL  07/2017    wisdom teeth extraction    HX WISDOM TEETH EXTRACTION  07/11/2017        Social History  The patient Dash Winters  reports that she quit smoking about 6 years ago. She has a 0.12 pack-year smoking history. She has never used smokeless tobacco. She reports that she drinks alcohol. She reports that she does not use illicit drugs.      Family History  Family History   Problem Relation Age of Onset    Asthma Mother     Hypertension Father     Diabetes Maternal Grandfather     Diabetes Paternal Grandmother     Cancer Paternal Grandfather      BRAIN TUMOR    Cancer Other      FGR GRANDFATHER-LUNG CA    Heart Disease Neg Hx     Stroke Neg Hx     Malignant Hyperthermia Neg Hx     Pseudocholinesterase Deficiency Neg Hx     Delayed Awakening Neg Hx     Post-op Nausea/Vomiting Neg Hx

## 2017-08-23 NOTE — BRIEF OP NOTE
BRIEF OPERATIVE NOTE    Date of Procedure: 8/23/2017   Preoperative Diagnosis:  Skin Lesion Right Inner Thigh. Postoperative Diagnosis:  Same. Procedure(s):   Excise Skin Lesion Right Inner Thigh. Surgeon(s) and Role:   Treva Oconnor MD - Primary  Surgical Staff:  Circ-1: Dusty Becerra RN  Scrub Tech-1: Nayan Lambert  Event Time In   Incision Start 1341   Incision Close 1407     Anesthesia: General   Estimated Blood Loss: Minimal.  Specimens:   ID Type Source Tests Collected by Time Destination   1 : Skin lesion right inner thigh Fresh Lesion  Treva Oconnor MD 8/23/2017 1347 Pathology      Findings: Raised skin lesion right inner thigh. Excised area approx. 2cm x 6cm. Complications: None.   Implants: * No implants in log *

## 2017-08-23 NOTE — ROUTINE PROCESS
Patient: Jean Bar MRN: [id number]  SSN: [id number]   YOB: 1985  Age: 36 y.o. Sex: female     Patient is status post Procedure(s):  EXCISION SKIN LESION RIGHT INNER THIGH. Surgeon(s) and Role:     * Poornima Morales MD - Primary    Local/Dose/Irrigation:  SEE MAR                  Peripheral IV 08/23/17 Left Wrist (Active)   Site Assessment Clean, dry, & intact 8/23/2017 11:04 AM   Dressing Type Transparent 8/23/2017 11:04 AM   Hub Color/Line Status Pink; Infusing 8/23/2017 11:04 AM   Alcohol Cap Used Yes 8/23/2017 11:04 AM            Airway - Endotracheal Tube 08/23/17 Oral (Active)                   Dressing/Packing:  Wound Thigh Right-DRESSING TYPE:  (DERMABOND) (08/23/17 1300)  Splint/Cast:  ]    Other:

## 2017-09-06 ENCOUNTER — OFFICE VISIT (OUTPATIENT)
Dept: SURGERY | Age: 32
End: 2017-09-06

## 2017-09-06 VITALS
HEART RATE: 68 BPM | DIASTOLIC BLOOD PRESSURE: 80 MMHG | RESPIRATION RATE: 16 BRPM | OXYGEN SATURATION: 98 % | HEIGHT: 65 IN | SYSTOLIC BLOOD PRESSURE: 120 MMHG | BODY MASS INDEX: 27.66 KG/M2 | TEMPERATURE: 98.1 F | WEIGHT: 166 LBS

## 2017-09-06 DIAGNOSIS — Z09 POSTOPERATIVE EXAMINATION: Primary | ICD-10-CM

## 2017-09-06 NOTE — PATIENT INSTRUCTIONS
Epidermoid Cyst: Care Instructions  Your Care Instructions  An epidermoid (say \"yg-tau-EBU-smita\") cyst is a lump just under the skin. These cysts can form when a hair follicle becomes blocked. They are common in acne and may occur on the face, neck, back, and genitals. However, they can form anywhere on the body. These cysts are not cancer and do not lead to cancer. They tend not to hurt, but they can sometimes become swollen and painful. They also may break open (rupture) and cause scarring. These cysts sometimes do not cause problems and may not need treatment. If you have a cyst that is swollen and hurts, your doctor may inject it with a medicine to help it heal. But it is more likely that a painful cyst will need to be removed. Your doctor will give you a shot of numbing medicine and cut into the cyst to drain it or remove it. This makes the symptoms go away. Follow-up care is a key part of your treatment and safety. Be sure to make and go to all appointments, and call your doctor if you are having problems. Its also a good idea to know your test results and keep a list of the medicines you take. How can you care for yourself at home? · Do not squeeze the cyst or poke it with a needle to open it. This can cause swelling, redness, and infection. · Always have a doctor look at any new lumps you get to make sure that they are not serious. When should you call for help? Watch closely for changes in your health, and be sure to contact your doctor if:  · You have a fever, redness, or swelling after you get a shot of medicine in the cyst.  · You see or feel a new lump on your skin. Where can you learn more? Go to http://arianna-camila.info/. Enter X819 in the search box to learn more about \"Epidermoid Cyst: Care Instructions. \"  Current as of: October 13, 2016  Content Version: 11.3  © 3092-4823 [employer].  Care instructions adapted under license by [employer] (which disclaims liability or warranty for this information). If you have questions about a medical condition or this instruction, always ask your healthcare professional. [address] any warranty or liability for your use of this information.

## 2017-09-06 NOTE — PROGRESS NOTES
Subjective:    Britt Pete is a 36 y.o. female presents for postop care following excision of recurrent cyst right inguinal area by Dr. Maya Gudino on 8/23/17. She is receiving wound care by self who reports no problems with wound care. She noticed 2 small areas of the incision has opened up, no drainage, no redness, no fevers, no warmth. The patient is not having any pain. .    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  Doing well postoperatively. Plan:     1. Wound care discussed. Wound opened up slightly but has good granulation tissue. Should fill in and heal nicely. Applied neosporin and covered with bandage. 2. Pt is to increase activities as tolerated. .  3. Follow-up in 2 weeks if wound has not improved/healed. Ms. Mary Grace Smith has a reminder for a \"due or due soon\" health maintenance. I have asked that she contact her primary care provider for follow-up on this health maintenance. Patient verbalized understanding and agreement.

## 2017-09-06 NOTE — PROGRESS NOTES
1. Have you been to the ER, urgent care clinic since your last visit? Hospitalized since your last visit? no    2. Have you seen or consulted any other health care providers outside of the [address] [place] [address] since your last visit? Include any pap smears or colon screening.  no

## 2018-02-27 ENCOUNTER — OFFICE VISIT (OUTPATIENT)
Dept: INTERNAL MEDICINE CLINIC | Age: 33
End: 2018-02-27

## 2018-02-27 ENCOUNTER — HOSPITAL ENCOUNTER (OUTPATIENT)
Dept: LAB | Age: 33
Discharge: HOME | End: 2018-02-27
Payer: COMMERCIAL

## 2018-02-27 VITALS
WEIGHT: 164.4 LBS | TEMPERATURE: 98.2 F | SYSTOLIC BLOOD PRESSURE: 104 MMHG | BODY MASS INDEX: 27.39 KG/M2 | HEART RATE: 79 BPM | DIASTOLIC BLOOD PRESSURE: 84 MMHG | HEIGHT: 65 IN | OXYGEN SATURATION: 99 %

## 2018-02-27 DIAGNOSIS — E04.1 THYROID NODULE: ICD-10-CM

## 2018-02-27 DIAGNOSIS — Z01.419 WELL WOMAN EXAM WITH ROUTINE GYNECOLOGICAL EXAM: ICD-10-CM

## 2018-02-27 DIAGNOSIS — Z00.00 PHYSICAL EXAM: Primary | ICD-10-CM

## 2018-02-27 DIAGNOSIS — E78.2 MIXED HYPERLIPIDEMIA: ICD-10-CM

## 2018-02-27 DIAGNOSIS — E55.9 VITAMIN D DEFICIENCY: ICD-10-CM

## 2018-02-27 DIAGNOSIS — E66.3 OVERWEIGHT (BMI 25.0-29.9): ICD-10-CM

## 2018-02-27 PROBLEM — R10.31 RIGHT GROIN PAIN: Status: RESOLVED | Noted: 2017-07-24 | Resolved: 2018-02-27

## 2018-02-27 PROBLEM — L98.9 SKIN LESION OF RIGHT LOWER EXTREMITY: Status: RESOLVED | Noted: 2017-08-07 | Resolved: 2018-02-27

## 2018-02-27 PROCEDURE — 87624 HPV HI-RISK TYP POOLED RSLT: CPT | Performed by: INTERNAL MEDICINE

## 2018-02-27 PROCEDURE — 88175 CYTOPATH C/V AUTO FLUID REDO: CPT | Performed by: INTERNAL MEDICINE

## 2018-02-27 NOTE — PROGRESS NOTES
Written by [facility], as dictated by Dr. Kirby Taylor MD.    Markell Napoles is a [address] [place] y.o. female. HPI  The patient comes in today for a complete physical examination and Pap smear. Her last Pap smear was in 2014. She denies any unusual vaginal discharge, itching, or burning. She used to have an IUD in place, but she had it removed as it was causing severe lower back pain and headaches, so she is currently taking [facility] and [place] oral contraceptives. She would like a referral to a new ob/gyn. She had surgery in 08/2017 with Dr. Dora Ahumada (general surgery) to have cysts in her groin removed. She has had issues with healing since she has had it operated on so many times, so she has been managing it on her own. She is not interested in seeing someone else for this at this time. She has lost weight, from 166 lbs in 09/2017 to 164 lbs today. She has been working on her diet and exercising more. She has a spot of eczema under her eye, for which she has seen dermatology in the past. She has not used any hydrocortisone cream on the spot. She denies seasonal allergies or urinary issues. She is compliant on vitamin D supplements whenever she remembers. Her last Tdap was in 2012. Patient Active Problem List   Diagnosis Code   (none) - all problems resolved or deleted        Current Outpatient Prescriptions on File Prior to Visit   Medication Sig Dispense Refill    NORGESTIMATE-ETHINYL ESTRADIOL (TRINESSA LO PO) Take 1 Tab by mouth daily.  cholecalciferol, vitamin D3, (VITAMIN D3) 2,000 unit Tab Take 1 Tab by mouth daily.  MULTIVITAMIN PO Take  by mouth. No current facility-administered medications on file prior to visit. Allergies   Allergen Reactions    Sulfa (Sulfonamide Antibiotics) Hives    Coconut Hives       Past Medical History:   Diagnosis Date    Adverse effect of anesthesia     slow to awake.  slow to function    Asthma 10/20/11 CHILDHOOD; NO INHALER IN YEARS    Right groin pain 7/24/2017    Skin lesion of right lower extremity 8/7/2017       Past Surgical History:   Procedure Laterality Date    HX CYST REMOVAL  11/01/2010    inner right thigh    HX CYST REMOVAL  10/2011    HX CYST REMOVAL  8/16/13    Excision recurrent cyst, right groin    HX CYST REMOVAL  08/2017    inner right thigh    HX OTHER SURGICAL  11/10    EXCISION R GROIN CYST X2    HX OTHER SURGICAL  07/2017    wisdom teeth extraction    HX OTHER SURGICAL Right 08/23/2017    excision skin lesion right inner thigh by Dr. Liz Bone.  HX WISDOM TEETH EXTRACTION  07/11/2017       Family History   Problem Relation Age of Onset    Asthma Mother     Hypertension Father     Diabetes Maternal Grandfather     Diabetes Paternal Grandmother     Cancer Paternal Grandfather      BRAIN TUMOR    Cancer Other      FGR GRANDFATHER-LUNG CA    Heart Disease Neg Hx     Stroke Neg Hx     Malignant Hyperthermia Neg Hx     Pseudocholinesterase Deficiency Neg Hx     Delayed Awakening Neg Hx     Post-op Nausea/Vomiting Neg Hx        Social History     Social History    Marital status:      Spouse name: N/A    Number of children: N/A    Years of education: N/A     Occupational History    Not on file. Social History Main Topics    Smoking status: Former Smoker     Packs/day: 0.25     Years: 0.50     Quit date: 8/11/2011    Smokeless tobacco: Never Used    Alcohol use Yes      Comment: one time a week    Drug use: No    Sexual activity: Yes     Partners: Male     Birth control/ protection: Pill     Other Topics Concern    Not on file     Social History Narrative       Review of Systems   Constitutional: Negative for malaise/fatigue. HENT: Negative for congestion. Eyes: Negative for blurred vision and pain. Respiratory: Negative for cough and shortness of breath. Cardiovascular: Negative for chest pain and palpitations.    Gastrointestinal: Negative for abdominal pain and heartburn. Genitourinary: Negative for frequency and urgency. Musculoskeletal: Negative for joint pain and myalgias. Neurological: Negative for dizziness, tingling, sensory change, weakness and headaches. Psychiatric/Behavioral: Negative for depression, memory loss and substance abuse. Visit Vitals    /84 (BP 1 Location: Left arm, BP Patient Position: Sitting)    Pulse 79    Temp 98.2 °F (36.8 °C) (Oral)    Ht 5' 5\" (1.651 m)    Wt 164 lb 6.4 oz (74.6 kg)    LMP 02/06/2018    SpO2 99%    BMI 27.36 kg/m2       Physical Exam   Constitutional: She is oriented to person, place, and time. She appears well-developed and well-nourished. No distress. HENT:   Right Ear: External ear normal.   Left Ear: External ear normal.   Eyes: Conjunctivae and EOM are normal. Right eye exhibits no discharge. Left eye exhibits no discharge. Neck: Normal range of motion. Neck supple. Thyroid nodule felt   Cardiovascular: Normal rate and regular rhythm. Pulses:       Dorsalis pedis pulses are 2+ on the right side, and 2+ on the left side. Pulmonary/Chest: Effort normal and breath sounds normal. She has no wheezes. Abdominal: Soft. Bowel sounds are normal. There is no tenderness. Genitourinary: Vaginal discharge found. Genitourinary Comments: White vaginal discharge   Lymphadenopathy:     She has no cervical adenopathy. Neurological: She is alert and oriented to person, place, and time. Reflex Scores:       Patellar reflexes are 2+ on the right side and 2+ on the left side. Skin: She is not diaphoretic. Psychiatric: She has a normal mood and affect. Her behavior is normal.   Nursing note and vitals reviewed. ASSESSMENT and PLAN    ICD-10-CM ICD-9-CM    1. Physical exam D94.59 I55.1 METABOLIC PANEL, COMPREHENSIVE      CBC W/O DIFF      TSH 3RD GENERATION    Complete physical exam done. Basic labs drawn. US ordered to evaluate thyroid nodule.    2. Overweight (BMI 25.0-29. 9) E66.3 278.02 Commended her on her weight loss. 3. Mixed hyperlipidemia E78.2 272.2 LIPID PANEL    Controlled with diet and exercise. 4. Vitamin D deficiency E55.9 268.9 VITAMIN D, 25 HYDROXY    Compliant on vitamin D supplements. 5. Well woman exam with routine gynecological exam Z01.419 V72.31 PAP IG, APTIMA HPV AND RFX 16/18,45 ([id number])    Pap smear performed in office. Pt tolerated well. Recommended OTC 1% hydrocortisone cream BID for 4-5 days. This plan was reviewed with the patient and patient agrees. All questions were answered. This scribe documentation was reviewed by me and accurately reflects the examination and decisions made by me.

## 2018-02-27 NOTE — PROGRESS NOTES
Chief Complaint   Patient presents with    Complete Physical     Visit Vitals    /84 (BP 1 Location: Left arm, BP Patient Position: Sitting)    Pulse 79    Temp 98.2 °F (36.8 °C) (Oral)    Ht 5' 5\" (1.651 m)    Wt 164 lb 6.4 oz (74.6 kg)    SpO2 99%    BMI 27.36 kg/m2     1. Have you been to the ER, urgent care clinic since your last visit? Hospitalized since your last visit?no    2. Have you seen or consulted any other health care providers outside of the [address] [place] [address] since your last visit? Include any pap smears or colon screening.  no

## 2018-02-28 LAB
25(OH)D3+25(OH)D2 SERPL-MCNC: 28.3 NG/ML (ref 30–100)
ALBUMIN SERPL-MCNC: 4.8 G/DL (ref 3.5–5.5)
ALBUMIN/GLOB SERPL: 2.1 {RATIO} (ref 1.2–2.2)
ALP SERPL-CCNC: 80 IU/L (ref 39–117)
ALT SERPL-CCNC: 17 IU/L (ref 0–32)
AST SERPL-CCNC: 18 IU/L (ref 0–40)
BILIRUB SERPL-MCNC: 0.2 MG/DL (ref 0–1.2)
BUN SERPL-MCNC: 14 MG/DL (ref 6–20)
BUN/CREAT SERPL: 17 (ref 9–23)
CALCIUM SERPL-MCNC: 9.4 MG/DL (ref 8.7–10.2)
CHLORIDE SERPL-SCNC: 99 MMOL/L (ref 96–106)
CHOLEST SERPL-MCNC: 229 MG/DL (ref 100–199)
CO2 SERPL-SCNC: 23 MMOL/L (ref 18–29)
CREAT SERPL-MCNC: 0.83 MG/DL (ref 0.57–1)
ERYTHROCYTE [DISTWIDTH] IN BLOOD BY AUTOMATED COUNT: 14.2 % (ref 12.3–15.4)
GFR SERPLBLD CREATININE-BSD FMLA CKD-EPI: 109 ML/MIN/1.73
GFR SERPLBLD CREATININE-BSD FMLA CKD-EPI: 95 ML/MIN/1.73
GLOBULIN SER CALC-MCNC: 2.3 G/DL (ref 1.5–4.5)
GLUCOSE SERPL-MCNC: 82 MG/DL (ref 65–99)
HCT VFR BLD AUTO: 39.7 % (ref 34–46.6)
HDLC SERPL-MCNC: 73 MG/DL
HGB BLD-MCNC: 13.2 G/DL (ref 11.1–15.9)
INTERPRETATION, 910389: NORMAL
LDLC SERPL CALC-MCNC: 136 MG/DL (ref 0–99)
MCH RBC QN AUTO: 29.2 PG (ref 26.6–33)
MCHC RBC AUTO-ENTMCNC: 33.2 G/DL (ref 31.5–35.7)
MCV RBC AUTO: 88 FL (ref 79–97)
PLATELET # BLD AUTO: 201 X10E3/UL (ref 150–379)
POTASSIUM SERPL-SCNC: 4.2 MMOL/L (ref 3.5–5.2)
PROT SERPL-MCNC: 7.1 G/DL (ref 6–8.5)
RBC # BLD AUTO: 4.52 X10E6/UL (ref 3.77–5.28)
SODIUM SERPL-SCNC: 138 MMOL/L (ref 134–144)
TRIGL SERPL-MCNC: 101 MG/DL (ref 0–149)
TSH SERPL DL<=0.005 MIU/L-ACNC: 1.21 UIU/ML (ref 0.45–4.5)
VLDLC SERPL CALC-MCNC: 20 MG/DL (ref 5–40)
WBC # BLD AUTO: 5 X10E3/UL (ref 3.4–10.8)

## 2018-02-28 NOTE — PROGRESS NOTES
Roxie,your cholesterol numbers came back same. I think it`s hereditary. I would advise doing cardio exercise 4-5 times a week. Low carb diet for 3 months. If repeat labs come back high we can discuss medication option. Please keep taking vitamin D daily.

## 2018-03-05 ENCOUNTER — HOSPITAL ENCOUNTER (OUTPATIENT)
Dept: ULTRASOUND IMAGING | Age: 33
Discharge: HOME | End: 2018-03-05
Attending: INTERNAL MEDICINE
Payer: COMMERCIAL

## 2018-03-05 DIAGNOSIS — E04.1 THYROID NODULE: ICD-10-CM

## 2018-03-05 PROCEDURE — 76536 US EXAM OF HEAD AND NECK: CPT

## 2018-06-16 ENCOUNTER — OFFICE VISIT (OUTPATIENT)
Dept: URGENT CARE | Age: 33
End: 2018-06-16

## 2018-06-16 VITALS
BODY MASS INDEX: 28.82 KG/M2 | SYSTOLIC BLOOD PRESSURE: 129 MMHG | DIASTOLIC BLOOD PRESSURE: 86 MMHG | RESPIRATION RATE: 18 BRPM | OXYGEN SATURATION: 99 % | HEIGHT: 65 IN | HEART RATE: 75 BPM | TEMPERATURE: 97.7 F | WEIGHT: 173 LBS

## 2018-06-16 DIAGNOSIS — J02.9 SORE THROAT: Primary | ICD-10-CM

## 2018-06-16 DIAGNOSIS — J02.9 ACUTE PHARYNGITIS, UNSPECIFIED ETIOLOGY: ICD-10-CM

## 2018-06-16 LAB
S PYO AG THROAT QL: NEGATIVE
VALID INTERNAL CONTROL?: YES

## 2018-06-16 NOTE — PATIENT INSTRUCTIONS
Upper Respiratory Infection (Cold): Care Instructions  Your Care Instructions    An upper respiratory infection, or URI, is an infection of the nose, sinuses, or throat. URIs are spread by coughs, sneezes, and direct contact. The common cold is the most frequent kind of URI. The flu and sinus infections are other kinds of URIs. Almost all URIs are caused by viruses. Antibiotics won't cure them. But you can treat most infections with home care. This may include drinking lots of fluids and taking over-the-counter pain medicine. You will probably feel better in 4 to 10 days. The doctor has checked you carefully, but problems can develop later. If you notice any problems or new symptoms, get medical treatment right away. Follow-up care is a key part of your treatment and safety. Be sure to make and go to all appointments, and call your doctor if you are having problems. It's also a good idea to know your test results and keep a list of the medicines you take. How can you care for yourself at home? · To prevent dehydration, drink plenty of fluids, enough so that your urine is light yellow or clear like water. Choose water and other caffeine-free clear liquids until you feel better. If you have kidney, heart, or liver disease and have to limit fluids, talk with your doctor before you increase the amount of fluids you drink. · Take an over-the-counter pain medicine, such as acetaminophen (Tylenol), ibuprofen (Advil, Motrin), or naproxen (Aleve). Read and follow all instructions on the label. · Before you use cough and cold medicines, check the label. These medicines may not be safe for young children or for people with certain health problems. · Be careful when taking over-the-counter cold or flu medicines and Tylenol at the same time. Many of these medicines have acetaminophen, which is Tylenol. Read the labels to make sure that you are not taking more than the recommended dose.  Too much acetaminophen (Tylenol) can be harmful. · Get plenty of rest.  · Do not smoke or allow others to smoke around you. If you need help quitting, talk to your doctor about stop-smoking programs and medicines. These can increase your chances of quitting for good. When should you call for help? Call 911 anytime you think you may need emergency care. For example, call if:  ? · You have severe trouble breathing. ?Call your doctor now or seek immediate medical care if:  ? · You seem to be getting much sicker. ? · You have new or worse trouble breathing. ? · You have a new or higher fever. ? · You have a new rash. ? Watch closely for changes in your health, and be sure to contact your doctor if:  ? · You have a new symptom, such as a sore throat, an earache, or sinus pain. ? · You cough more deeply or more often, especially if you notice more mucus or a change in the color of your mucus. ? · You do not get better as expected. Where can you learn more? Go to http://arianna-camila.info/. Enter T223 in the search box to learn more about \"Upper Respiratory Infection (Cold): Care Instructions. \"  Current as of: May 12, 2017  Content Version: 11.4  © 0539-7323 Healthwise, Incorporated. Care instructions adapted under license by [employer] (which disclaims liability or warranty for this information). If you have questions about a medical condition or this instruction, always ask your healthcare professional. [place] [address] any warranty or liability for your use of this information.

## 2018-06-16 NOTE — PROGRESS NOTES
HPI Comments:  diagnosed and treated for strep C. Reports itchy throat for one day but no fever. Her voice is hoarse. Patient is a 29 y.o. female presenting with sore throat. The history is provided by the patient. Sore Throat    The history is provided by the patient. The current episode started 12 to 24 hours ago. Associated symptoms include drooling. Pertinent negatives include no diarrhea, no vomiting and no cough. Past Medical History:   Diagnosis Date    Adverse effect of anesthesia     slow to awake. slow to function    Asthma 10/20/11    CHILDHOOD; NO INHALER IN YEARS    Right groin pain 7/24/2017    Skin lesion of right lower extremity 8/7/2017        Past Surgical History:   Procedure Laterality Date    HX CYST REMOVAL  11/01/2010    inner right thigh    HX CYST REMOVAL  10/2011    HX CYST REMOVAL  8/16/13    Excision recurrent cyst, right groin    HX CYST REMOVAL  08/2017    inner right thigh    HX OTHER SURGICAL  11/10    EXCISION R GROIN CYST X2    HX OTHER SURGICAL  07/2017    wisdom teeth extraction    HX OTHER SURGICAL Right 08/23/2017    excision skin lesion right inner thigh by Dr. Shawn Yeung.  HX WISDOM TEETH EXTRACTION  07/11/2017         Family History   Problem Relation Age of Onset    Asthma Mother     Hypertension Father     Diabetes Maternal Grandfather     Diabetes Paternal Grandmother     Cancer Paternal Grandfather      BRAIN TUMOR    Cancer Other      FGR GRANDFATHER-LUNG CA    Heart Disease Neg Hx     Stroke Neg Hx     Malignant Hyperthermia Neg Hx     Pseudocholinesterase Deficiency Neg Hx     Delayed Awakening Neg Hx     Post-op Nausea/Vomiting Neg Hx         Social History     Social History    Marital status:      Spouse name: N/A    Number of children: N/A    Years of education: N/A     Occupational History    Not on file.      Social History Main Topics    Smoking status: Former Smoker     Packs/day: 0.25     Years: 0.50 Quit date: 8/11/2011    Smokeless tobacco: Never Used    Alcohol use Yes      Comment: one time a week    Drug use: No    Sexual activity: Yes     Partners: Male     Birth control/ protection: Pill     Other Topics Concern    Not on file     Social History Narrative                ALLERGIES: Sulfa (sulfonamide antibiotics) and Coconut    Review of Systems   Constitutional: Negative for chills and fever. HENT: Positive for drooling and sore throat. Respiratory: Negative for cough. Gastrointestinal: Negative for diarrhea, nausea and vomiting. Vitals:    06/16/18 1928   BP: 146/83   Pulse: 83   Resp: 18   Temp: 97.7 °F (36.5 °C)   SpO2: 99%   Weight: 173 lb (78.5 kg)   Height: 5' 5\" (1.651 m)       Physical Exam   Constitutional: She is oriented to person, place, and time. She appears well-developed and well-nourished. HENT:   Nose: Nose normal.   Mouth/Throat: Oropharynx is clear and moist. No oropharyngeal exudate. Neurological: She is alert and oriented to person, place, and time. Skin: Skin is warm and dry. Psychiatric: She has a normal mood and affect. Her behavior is normal.   Nursing note and vitals reviewed. MDM     Differential Diagnosis; Clinical Impression; Plan:     Pharyngitis likely viral . Explained that Strep C can be part of normal bacteria in the mouth. Will defer treatment pending culture due to her benign clinical picture. If the culture is positive and her symptoms have worsened she can be offered antibiotics at that time.    Amount and/or Complexity of Data Reviewed:   Clinical lab tests:  Reviewed  Progress:   Patient progress:  Stable      Procedures

## 2018-06-16 NOTE — MR AVS SNAPSHOT
[address] [place] [address] 
[phone] Patient: Nahed Rivero MRN: [id number] :1985 Visit Information Date & Time Provider Department Dept. Phone Encounter #  
 2018  7:00 PM [address] [facility] [phone] [id number] Your Appointments 2018 11:10 AM  
New Patient with MD [facility] [address] [place] [address]) Appt Note: new pt, self referred, thyroids, CP$20.00 CC, MRM 18; new pt, self referred, thyroids, CP$20.00 CC, MRM 18; r/s from , AB, 18  
 [address] [facility] [address] [address] [phone] [facility] Upcoming Health Maintenance Date Due Influenza Age 5 to Adult 2018 PAP AKA CERVICAL CYTOLOGY 2021 DTaP/Tdap/Td series (2 - Td) 2022 Allergies as of 2018  Review Complete On: 2018 By: Joelle Miller RN Severity Noted Reaction Type Reactions Sulfa (Sulfonamide Antibiotics) Medium 2011   Systemic Hives Coconut  10/20/2011    Hives Current Immunizations  Reviewed on 3/5/2013 Name Date HPV (Quad) 3/5/2013 Human Papillomavirus 2012, 2012 Not reviewed this visit You Were Diagnosed With   
  
 Codes Comments Sore throat    -  Primary ICD-10-CM: J02.9 ICD-9-CM: 274 Acute pharyngitis, unspecified etiology     ICD-10-CM: J02.9 ICD-9-CM: 642 Vitals BP Pulse Temp Resp Height(growth percentile) Weight(growth percentile) 129/86 75 97.7 °F (36.5 °C) 18 5' 5\" (1.651 m) 173 lb (78.5 kg) SpO2 BMI OB Status Smoking Status 99% 28.79 kg/m2 Having regular periods Former Smoker Vitals History BMI and BSA Data Body Mass Index Body Surface Area 28.79 kg/m 2 1.9 m 2 Preferred Pharmacy Pharmacy Name Phone NO PHARMACY PREFERENCE Your Updated Medication List  
  
   
This list is accurate as of 6/16/18  7:57 PM.  Always use your most recent med list.  
  
  
  
  
 MULTIVITAMIN PO Take  by mouth. TRINESSA LO PO Take 1 Tab by mouth daily. VITAMIN D3 2,000 unit Tab Generic drug:  cholecalciferol (vitamin D3) Take 1 Tab by mouth daily. We Performed the Following AMB POC RAPID STREP A [80640 CPT(R)] CULTURE, STREP THROAT [id number] CPT(R)] Patient Instructions Upper Respiratory Infection (Cold): Care Instructions Your Care Instructions An upper respiratory infection, or URI, is an infection of the nose, sinuses, or throat. URIs are spread by coughs, sneezes, and direct contact. The common cold is the most frequent kind of URI. The flu and sinus infections are other kinds of URIs. Almost all URIs are caused by viruses. Antibiotics won't cure them. But you can treat most infections with home care. This may include drinking lots of fluids and taking over-the-counter pain medicine. You will probably feel better in 4 to 10 days. The doctor has checked you carefully, but problems can develop later. If you notice any problems or new symptoms, get medical treatment right away. Follow-up care is a key part of your treatment and safety. Be sure to make and go to all appointments, and call your doctor if you are having problems. It's also a good idea to know your test results and keep a list of the medicines you take. How can you care for yourself at home? · To prevent dehydration, drink plenty of fluids, enough so that your urine is light yellow or clear like water. Choose water and other caffeine-free clear liquids until you feel better. If you have kidney, heart, or liver disease and have to limit fluids, talk with your doctor before you increase the amount of fluids you drink. · Take an over-the-counter pain medicine, such as acetaminophen (Tylenol), ibuprofen (Advil, Motrin), or naproxen (Aleve). Read and follow all instructions on the label. · Before you use cough and cold medicines, check the label. These medicines may not be safe for young children or for people with certain health problems. · Be careful when taking over-the-counter cold or flu medicines and Tylenol at the same time. Many of these medicines have acetaminophen, which is Tylenol. Read the labels to make sure that you are not taking more than the recommended dose. Too much acetaminophen (Tylenol) can be harmful. · Get plenty of rest. 
· Do not smoke or allow others to smoke around you. If you need help quitting, talk to your doctor about stop-smoking programs and medicines. These can increase your chances of quitting for good. When should you call for help? Call 911 anytime you think you may need emergency care. For example, call if: 
? · You have severe trouble breathing. ?Call your doctor now or seek immediate medical care if: 
? · You seem to be getting much sicker. ? · You have new or worse trouble breathing. ? · You have a new or higher fever. ? · You have a new rash. ? Watch closely for changes in your health, and be sure to contact your doctor if: 
? · You have a new symptom, such as a sore throat, an earache, or sinus pain. ? · You cough more deeply or more often, especially if you notice more mucus or a change in the color of your mucus. ? · You do not get better as expected. Where can you learn more? Go to http://arianna-camila.info/. Enter R628 in the search box to learn more about \"Upper Respiratory Infection (Cold): Care Instructions. \" Current as of: May 12, 2017 Content Version: 11.4 © 0377-1529 Healthwise, [employer].  Care instructions adapted under license by [employer] (which disclaims liability or warranty for this information). If you have questions about a medical condition or this instruction, always ask your healthcare professional. [address] any warranty or liability for your use of this information. Introducing [facility]! Dear Kerri Standing: Thank you for requesting a Sellobuy account. Our records indicate that you already have an active Sellobuy account. You can access your account anytime at [email or web address] Did you know that you can access your hospital and ER discharge instructions at any time in Sellobuy? You can also review all of your test results from your hospital stay or ER visit. Additional Information If you have questions, please visit the Frequently Asked Questions section of the Sellobuy website at [email or web address] Remember, Sellobuy is NOT to be used for urgent needs. For medical emergencies, dial 911. Now available from your iPhone and Android! Please provide this summary of care documentation to your next provider. Your primary care clinician is listed as Faith Smart. If you have any questions after today's visit, please call [phone].

## 2018-06-20 ENCOUNTER — TELEPHONE (OUTPATIENT)
Dept: URGENT CARE | Age: 33
End: 2018-06-20

## 2018-06-20 LAB — S PYO THROAT QL CULT: ABNORMAL

## 2018-06-20 NOTE — PROGRESS NOTES
Notify patient + bacterial growth in throat cx, not strep. Call in Amoxicillin 875mg PO BID x 10 days to pharmacy. Pharmacy not on file.

## 2018-07-07 ENCOUNTER — OFFICE VISIT (OUTPATIENT)
Dept: URGENT CARE | Age: 33
End: 2018-07-07

## 2018-07-07 VITALS
RESPIRATION RATE: 16 BRPM | SYSTOLIC BLOOD PRESSURE: 127 MMHG | OXYGEN SATURATION: 98 % | DIASTOLIC BLOOD PRESSURE: 81 MMHG | HEART RATE: 76 BPM | TEMPERATURE: 98 F | HEIGHT: 65 IN | WEIGHT: 174 LBS | BODY MASS INDEX: 28.99 KG/M2

## 2018-07-07 DIAGNOSIS — J02.9 SORE THROAT: Primary | ICD-10-CM

## 2018-07-07 LAB
S PYO AG THROAT QL: NEGATIVE
VALID INTERNAL CONTROL?: YES

## 2018-07-07 RX ORDER — PENICILLIN V POTASSIUM 500 MG/1
500 TABLET, FILM COATED ORAL 4 TIMES DAILY
Qty: 40 TAB | Refills: 0 | Status: SHIPPED | OUTPATIENT
Start: 2018-07-07 | End: 2018-07-17

## 2018-07-07 NOTE — MR AVS SNAPSHOT
[address] [facility] [address] 
[phone] Patient: Bari Ruiz MRN: [id number] :1985 Visit Information Date & Time Provider Department Dept. Phone Encounter #  
 2018  8:45 AM [address] [facility] [phone] [id number] Your Appointments 2018 11:10 AM  
New Patient with Meghan Larsen MD  
[facility] [address] [place] [address]) Appt Note: new pt, self referred, thyroids, CP$20.00 CC, MRM 18; new pt, self referred, thyroids, CP$20.00 CC, MRM 18; r/s from , AB, 18  
 [address] [facility] [address] [address] [address] 570 [facility] Upcoming Health Maintenance Date Due Influenza Age 5 to Adult 2018 PAP AKA CERVICAL CYTOLOGY 2021 DTaP/Tdap/Td series (2 - Td) 2022 Allergies as of 2018  Review Complete On: 2018 By: Puja Jane RN Severity Noted Reaction Type Reactions Sulfa (Sulfonamide Antibiotics) Medium 2011   Systemic Hives Coconut  10/20/2011    Hives Current Immunizations  Reviewed on 3/5/2013 Name Date HPV (Quad) 3/5/2013 Human Papillomavirus 2012, 2012 Not reviewed this visit You Were Diagnosed With   
  
 Codes Comments Sore throat    -  Primary ICD-10-CM: J02.9 ICD-9-CM: 462 RST- negative Vitals BP Pulse Temp Resp Height(growth percentile) Weight(growth percentile) 127/81 76 98 °F (36.7 °C) 16 5' 5\" (1.651 m) 174 lb (78.9 kg) LMP SpO2 BMI OB Status Smoking Status 2018 98% 28.96 kg/m2 Having regular periods Former Smoker BMI and BSA Data Body Mass Index Body Surface Area  
 28.96 kg/m 2 1.9 m 2 Preferred Pharmacy Pharmacy Name Phone  400 [facility], 302 [facility] AT [address] [place] [address] Mukesh Alfredo [phone] Your Updated Medication List  
  
   
This list is accurate as of 7/7/18  9:15 AM.  Always use your most recent med list.  
  
  
  
  
 MULTIVITAMIN PO Take  by mouth.  
  
 penicillin v potassium 500 mg tablet Commonly known as:  VEETID Take 1 Tab by mouth four (4) times daily for 10 days. TRINESSA LO PO Take 1 Tab by mouth daily. VITAMIN D3 2,000 unit Tab Generic drug:  cholecalciferol (vitamin D3) Take 1 Tab by mouth daily. Prescriptions Sent to Pharmacy Refills  
 penicillin v potassium (VEETID) 500 mg tablet 0 Sig: Take 1 Tab by mouth four (4) times daily for 10 days. Class: Normal  
 Pharmacy: [facility] [address] [place] [address], [address] [place] [address] [facility] #: [phone] Route: Oral  
  
We Performed the Following AMB POC RAPID STREP A [15913 CPT(R)] Patient Instructions Sore Throat: Care Instructions Your Care Instructions Infection by bacteria or a virus causes most sore throats. Cigarette smoke, dry air, air pollution, allergies, and yelling can also cause a sore throat. Sore throats can be painful and annoying. Fortunately, most sore throats go away on their own. If you have a bacterial infection, your doctor may prescribe antibiotics. Follow-up care is a key part of your treatment and safety. Be sure to make and go to all appointments, and call your doctor if you are having problems. It's also a good idea to know your test results and keep a list of the medicines you take. How can you care for yourself at home? · If your doctor prescribed antibiotics, take them as directed. Do not stop taking them just because you feel better. You need to take the full course of antibiotics. · Gargle with warm salt water once an hour to help reduce swelling and relieve discomfort. Use 1 teaspoon of salt mixed in 1 cup of warm water. · Take an over-the-counter pain medicine, such as acetaminophen (Tylenol), ibuprofen (Advil, Motrin), or naproxen (Aleve). Read and follow all instructions on the label. · Be careful when taking over-the-counter cold or flu medicines and Tylenol at the same time. Many of these medicines have acetaminophen, which is Tylenol. Read the labels to make sure that you are not taking more than the recommended dose. Too much acetaminophen (Tylenol) can be harmful. · Drink plenty of fluids. Fluids may help soothe an irritated throat. Hot fluids, such as tea or soup, may help decrease throat pain. · Use over-the-counter throat lozenges to soothe pain. Regular cough drops or hard candy may also help. These should not be given to young children because of the risk of choking. · Do not smoke or allow others to smoke around you. If you need help quitting, talk to your doctor about stop-smoking programs and medicines. These can increase your chances of quitting for good. · Use a vaporizer or humidifier to add moisture to your bedroom. Follow the directions for cleaning the machine. When should you call for help? Call your doctor now or seek immediate medical care if: 
? · You have new or worse trouble swallowing. ? · Your sore throat gets much worse on one side. ? Watch closely for changes in your health, and be sure to contact your doctor if you do not get better as expected. Where can you learn more? Go to http://arianna-camila.info/. Enter [phone] [id number] [phone] in the search box to learn more about \"Sore Throat: Care Instructions. \" Current as of: May 12, 2017 Content Version: 11.4 © 0715-7841 Thumbs Up. Care instructions adapted under license by Business Exchange (which disclaims liability or warranty for this information).  If you have questions about a medical condition or this instruction, always ask your healthcare professional. [employer] disclaims any warranty or liability for your use of this information. Introducing [facility] & HEALTH SERVICES! Dear Chelsea Newton: Thank you for requesting a Pipette account. Our records indicate that you already have an active Pipette account. You can access your account anytime at [email or web address] Did you know that you can access your hospital and ER discharge instructions at any time in Pipette? You can also review all of your test results from your hospital stay or ER visit. Additional Information If you have questions, please visit the Frequently Asked Questions section of the Pipette website at [email or web address] Remember, Pipette is NOT to be used for urgent needs. For medical emergencies, dial 911. Now available from your iPhone and Android! Please provide this summary of care documentation to your next provider. Your primary care clinician is listed as Mayra Santos. If you have any questions after today's visit, please call [phone].

## 2018-07-07 NOTE — PROGRESS NOTES
Patient is a 29 y.o. female presenting with sore throat. Sore Throat    The history is provided by the patient. This is a recurrent problem. The current episode started yesterday. The problem has not changed since onset. There has been no fever. Associated symptoms include swollen glands and trouble swallowing. She has had exposure to strep. Treatments tried: amoxicillin x 7-8 days. Improvement on treatment: ?? Past Medical History:   Diagnosis Date    Adverse effect of anesthesia     slow to awake. slow to function    Asthma 10/20/11    CHILDHOOD; NO INHALER IN YEARS    Right groin pain 7/24/2017    Skin lesion of right lower extremity 8/7/2017        Past Surgical History:   Procedure Laterality Date    HX CYST REMOVAL  11/01/2010    inner right thigh    HX CYST REMOVAL  10/2011    HX CYST REMOVAL  8/16/13    Excision recurrent cyst, right groin    HX CYST REMOVAL  08/2017    inner right thigh    HX OTHER SURGICAL  11/10    EXCISION R GROIN CYST X2    HX OTHER SURGICAL  07/2017    wisdom teeth extraction    HX OTHER SURGICAL Right 08/23/2017    excision skin lesion right inner thigh by Dr. Espinoza Ram.  HX WISDOM TEETH EXTRACTION  07/11/2017         Family History   Problem Relation Age of Onset    Asthma Mother     Hypertension Father     Diabetes Maternal Grandfather     Diabetes Paternal Grandmother     Cancer Paternal Grandfather      BRAIN TUMOR    Cancer Other      FGR GRANDFATHER-LUNG CA    Heart Disease Neg Hx     Stroke Neg Hx     Malignant Hyperthermia Neg Hx     Pseudocholinesterase Deficiency Neg Hx     Delayed Awakening Neg Hx     Post-op Nausea/Vomiting Neg Hx         Social History     Social History    Marital status:      Spouse name: N/A    Number of children: N/A    Years of education: N/A     Occupational History    Not on file.      Social History Main Topics    Smoking status: Former Smoker     Packs/day: 0.25     Years: 0.50     Quit date: 8/11/2011    Smokeless tobacco: Never Used    Alcohol use Yes      Comment: one time a week    Drug use: No    Sexual activity: Yes     Partners: Male     Birth control/ protection: Pill     Other Topics Concern    Not on file     Social History Narrative                ALLERGIES: Sulfa (sulfonamide antibiotics) and Coconut    Review of Systems   HENT: Positive for sore throat and trouble swallowing. All other systems reviewed and are negative. Vitals:    07/07/18 0844   BP: 127/81   Pulse: 76   Resp: 16   Temp: 98 °F (36.7 °C)   SpO2: 98%   Weight: 174 lb (78.9 kg)   Height: 5' 5\" (1.651 m)       Physical Exam   Constitutional: No distress. HENT:   Right Ear: Tympanic membrane and ear canal normal.   Left Ear: Tympanic membrane and ear canal normal.   Nose: Nose normal.   Mouth/Throat: Uvula swelling present. Posterior oropharyngeal erythema present. No oropharyngeal exudate or posterior oropharyngeal edema. Eyes: Conjunctivae are normal. Right eye exhibits no discharge. Left eye exhibits no discharge. Neck: Neck supple. Pulmonary/Chest: Effort normal and breath sounds normal. No respiratory distress. She has no decreased breath sounds. She has no wheezes. She has no rhonchi. She has no rales. Lymphadenopathy:     She has no cervical adenopathy. Skin: No rash noted. Nursing note and vitals reviewed. MDM    Procedures      ICD-10-CM ICD-9-CM    1. Sore throat J02.9 462 AMB POC RAPID STREP A    RST- negative     Medications Ordered Today   Medications    penicillin v potassium (VEETID) 500 mg tablet     Sig: Take 1 Tab by mouth four (4) times daily for 10 days. Dispense:  40 Tab     Refill:  0     Results for orders placed or performed in visit on 07/07/18   AMB POC RAPID STREP A   Result Value Ref Range    VALID INTERNAL CONTROL POC Yes     Group A Strep Ag Negative Negative     The patients condition was discussed with the patient and they understand.   The patient is to follow up with primary care doctor. If signs and symptoms become worse the pt is to go to the ER. The patient is to take medications as prescribed.

## 2018-07-07 NOTE — PATIENT INSTRUCTIONS
Sore Throat: Care Instructions  Your Care Instructions    Infection by bacteria or a virus causes most sore throats. Cigarette smoke, dry air, air pollution, allergies, and yelling can also cause a sore throat. Sore throats can be painful and annoying. Fortunately, most sore throats go away on their own. If you have a bacterial infection, your doctor may prescribe antibiotics. Follow-up care is a key part of your treatment and safety. Be sure to make and go to all appointments, and call your doctor if you are having problems. It's also a good idea to know your test results and keep a list of the medicines you take. How can you care for yourself at home? · If your doctor prescribed antibiotics, take them as directed. Do not stop taking them just because you feel better. You need to take the full course of antibiotics. · Gargle with warm salt water once an hour to help reduce swelling and relieve discomfort. Use 1 teaspoon of salt mixed in 1 cup of warm water. · Take an over-the-counter pain medicine, such as acetaminophen (Tylenol), ibuprofen (Advil, Motrin), or naproxen (Aleve). Read and follow all instructions on the label. · Be careful when taking over-the-counter cold or flu medicines and Tylenol at the same time. Many of these medicines have acetaminophen, which is Tylenol. Read the labels to make sure that you are not taking more than the recommended dose. Too much acetaminophen (Tylenol) can be harmful. · Drink plenty of fluids. Fluids may help soothe an irritated throat. Hot fluids, such as tea or soup, may help decrease throat pain. · Use over-the-counter throat lozenges to soothe pain. Regular cough drops or hard candy may also help. These should not be given to young children because of the risk of choking. · Do not smoke or allow others to smoke around you. If you need help quitting, talk to your doctor about stop-smoking programs and medicines.  These can increase your chances of quitting for good. · Use a vaporizer or humidifier to add moisture to your bedroom. Follow the directions for cleaning the machine. When should you call for help? Call your doctor now or seek immediate medical care if:  ? · You have new or worse trouble swallowing. ? · Your sore throat gets much worse on one side. ? Watch closely for changes in your health, and be sure to contact your doctor if you do not get better as expected. Where can you learn more? Go to http://arianna-camila.info/. Enter [phone] in the search box to learn more about \"Sore Throat: Care Instructions. \"  Current as of: May 12, 2017  Content Version: 11.4  © 3853-2914 Healthwise, Incorporated. Care instructions adapted under license by Un-Lease.com (which disclaims liability or warranty for this information). If you have questions about a medical condition or this instruction, always ask your healthcare professional. [address] any warranty or liability for your use of this information.

## 2018-08-08 ENCOUNTER — OFFICE VISIT (OUTPATIENT)
Dept: ENDOCRINOLOGY | Age: 33
End: 2018-08-08

## 2018-08-08 VITALS
HEART RATE: 89 BPM | HEIGHT: 65 IN | WEIGHT: 172.8 LBS | SYSTOLIC BLOOD PRESSURE: 113 MMHG | BODY MASS INDEX: 28.79 KG/M2 | DIASTOLIC BLOOD PRESSURE: 83 MMHG

## 2018-08-08 DIAGNOSIS — E04.1 THYROID CYST: ICD-10-CM

## 2018-08-08 DIAGNOSIS — E04.9 GOITER: Primary | ICD-10-CM

## 2018-08-08 NOTE — PROGRESS NOTES
CONSULTATION REQUESTED BY: Odalis Garcia MD     REASON FOR CONSULT: Goiter / Thyroid cysts    CHIEF COMPLAINT: Thyroid cysts    HISTORY OF PRESENT ILLNESS:   Casimiro Johnson is a 29 y.o. female with a PMHx as noted below who was referred to our endocrinology clinic for evaluation of thyroid cysts and goiter. Patient describes that she was noted for enlarged thyroid gland on exam,   This precipitated evaluation with an ultrasound that revealed a goiter with cysts. Family history is noted for various autoimmune conditions on both sides of her family. Patient denies any history of radiation exposure. They deny any dysphagia or dyspnea. Patient denies symptoms of hyper or hypothyroidism. Recent thyroid ultrasound was obtained and dated: 3/5/18  Findings:  Goiter with few cysts (0.4 cm on Rt, 1cm on left)     Review of most recent thyroid function:  Lab Results   Component Value Date    TSH 1.210 02/27/2018    TSH 1.000 12/07/2016    TSH 1.100 07/24/2014      TSILT = Thyroid stimulating antibodies  TMCLT = TPO antibodies  T3LT = Total T3 levels    At this time they would like to further investigate the nature of the thyroid nodule. PAST MEDICAL/SURGICAL HISTORY:   Past Medical History:   Diagnosis Date    Adverse effect of anesthesia     slow to awake. slow to function    Asthma 10/20/11    CHILDHOOD; NO INHALER IN YEARS    Right groin pain 7/24/2017    Skin lesion of right lower extremity 8/7/2017     Past Surgical History:   Procedure Laterality Date    HX CYST REMOVAL  11/01/2010    inner right thigh    HX CYST REMOVAL  10/2011    HX CYST REMOVAL  8/16/13    Excision recurrent cyst, right groin    HX CYST REMOVAL  08/2017    inner right thigh    HX OTHER SURGICAL  11/10    EXCISION R GROIN CYST X2    HX OTHER SURGICAL  07/2017    wisdom teeth extraction    HX OTHER SURGICAL Right 08/23/2017    excision skin lesion right inner thigh by Dr. Maris Villalobos.     HX WISDOM TEETH EXTRACTION 07/11/2017       ALLERGIES:   Allergies   Allergen Reactions    Sulfa (Sulfonamide Antibiotics) Hives    Coconut Hives       MEDICATIONS ON ADMISSION:     Current Outpatient Prescriptions:     aspirin/acetaminophen/caffeine (EXCEDRIN MIGRAINE PO), Take  by mouth., Disp: , Rfl:     NORGESTIMATE-ETHINYL ESTRADIOL (TRINESSA LO PO), Take 1 Tab by mouth daily. , Disp: , Rfl:     cholecalciferol, vitamin D3, (VITAMIN D3) 2,000 unit Tab, Take 1 Tab by mouth daily. , Disp: , Rfl:     MULTIVITAMIN PO, Take  by mouth., Disp: , Rfl:     SOCIAL HISTORY:   Social History     Social History    Marital status:      Spouse name: N/A    Number of children: N/A    Years of education: N/A     Occupational History    Not on file. Social History Main Topics    Smoking status: Light Tobacco Smoker     Packs/day: 0.25     Years: 0.50     Last attempt to quit: 8/11/2011    Smokeless tobacco: Never Used    Alcohol use Yes      Comment: one time a week    Drug use: No    Sexual activity: Yes     Partners: Male     Birth control/ protection: Pill     Other Topics Concern    Not on file     Social History Narrative       FAMILY HISTORY:  Family History   Problem Relation Age of Onset    Asthma Mother     Hypertension Father     Diabetes Maternal Grandfather     Diabetes Paternal Grandmother     Cancer Paternal Grandfather      BRAIN TUMOR    Cancer Other      FGR GRANDFATHER-LUNG CA    Heart Disease Neg Hx     Stroke Neg Hx     Malignant Hyperthermia Neg Hx     Pseudocholinesterase Deficiency Neg Hx     Delayed Awakening Neg Hx     Post-op Nausea/Vomiting Neg Hx        REVIEW OF SYSTEMS: Complete ROS assessed and noted for that which is described above, all else are negative.   Eyes: normal  ENT: normal  CVS: normal  Resp: normal  GI: normal  : normal  GYN: normal  Endocrine: normal  Integument: normal  Musculoskeletal: normal  Neuro: normal  Psych: normal    PHYSICAL EXAMINATION:    VITAL SIGNS:  Visit Vitals    /83 (BP 1 Location: Left arm, BP Patient Position: Sitting)    Pulse 89    Ht 5' 5\" (1.651 m)    Wt 172 lb 12.8 oz (78.4 kg)    BMI 28.76 kg/m2       GENERAL: NCAT, Sitting comfortably, NAD  EYES: EOMI, non-icteric, no proptosis  Ear/Nose/Throat: NCAT, no inflammation, thyroid gland is only mildly enlarged  LYMPH NODES: No LAD  CARDIOVASCULAR: S1 S2, RRR, No murmur, 2+ radial pulses  RESPIRATORY: CTA b/l, no wheeze/rales  GASTROINTESTINAL: NT, ND  MUSCULOSKELETAL: Normal ROM, no atrophy  SKIN: warm, no edema/rash/ or other skin changes  NEUROLOGIC: 5/5 power all extremities, AAOx3, no tremors  PSYCHIATRIC: Normal affect, Normal insight and judgement      REVIEW OF LABORATORY AND RADIOLOGY DATA:   Labs and documentation have been reviewed as described above. ASSESSMENT AND PLAN:   Madison Aguilar is a 29 y.o. female with a PMHx as noted above who was referred to our endocrinology clinic for evaluation of a goiter and thyroid cysts. Goiter  Thyroid Cysts    We spent time today reviewing the images of her thyroid ultrasound. We have noted the benign appearing cysts in her gland with the largest being in the left lobe. We did not find any solid nodules or any suspicious features. We discussed this benign nature of her cysts, but also discussed the propensity for cysts to both grow and shrink, and the various symptoms that she could experience if it grows, including dysphagia. Per measurements, her thyroid gland is a bit enlarged however her thyroid levels have been stable over the past 5-6 years. She does have a strong family history of functional thyroid disorders in both sides of her family, so an evaluation today with thyroid antibodies is warranted considering this family history and the enlarged gland. Otherwise I would note that her gland appears homogenous and does not have a typical appearance of thyroiditis. Plan:   Thyroid level, with TPO/TgAb today for status.    No need for future thyroid ultrasounds unless symptomatic    F/u based on results, otherwise PRN  Patient was welcomed to call/message with concerns,     Mgaui Gaines.  [address] [facility]

## 2018-08-08 NOTE — MR AVS SNAPSHOT
[address] [facility] [address] [address] [phone] Patient: Maribeth Norris MRN: [id number] :1985 Visit Information Date & Time Provider Department Dept. Phone Encounter #  
 2018 11:10 AM Carmelo Winter, [address] [place] [address] [facility] [phone] Follow-up Instructions Return if symptoms worsen or fail to improve. Upcoming Health Maintenance Date Due Influenza Age 5 to Adult 2018 PAP AKA CERVICAL CYTOLOGY 2021 DTaP/Tdap/Td series (2 - Td) 2022 Allergies as of 2018  Review Complete On: 2018 By: Carmelo Winter MD  
  
 Severity Noted Reaction Type Reactions Sulfa (Sulfonamide Antibiotics) Medium 2011   Systemic Hives Coconut  10/20/2011    Hives Current Immunizations  Reviewed on 3/5/2013 Name Date HPV (Quad) 3/5/2013 Human Papillomavirus 2012, 2012 Not reviewed this visit You Were Diagnosed With   
  
 Codes Comments Goiter    -  Primary ICD-10-CM: E04.9 ICD-9-CM: 240.9 Vitals BP Pulse Height(growth percentile) Weight(growth percentile) BMI OB Status 113/83 (BP 1 Location: Left arm, BP Patient Position: Sitting) 89 5' 5\" (1.651 m) 172 lb 12.8 oz (78.4 kg) 28.76 kg/m2 Having regular periods Smoking Status Light Tobacco Smoker BMI and BSA Data Body Mass Index Body Surface Area 28.76 kg/m 2 1.9 m 2 Preferred Pharmacy Pharmacy Name Phone [facility] [address] [facility], [address] [place] [address] AT [address] [place] [address] [phone] Your Updated Medication List  
  
   
This list is accurate as of 18 11:48 AM.  Always use your most recent med list.  
  
  
  
  
 Ricky Downs Take  by mouth. MULTIVITAMIN PO Take  by mouth. TRINESSA LO PO Take 1 Tab by mouth daily. VITAMIN D3 2,000 unit Tab Generic drug:  cholecalciferol (vitamin D3) Take 1 Tab by mouth daily. We Performed the Following T4, FREE [id number] CPT(R)] THYROGLOBULIN AB [id number] CPT(R)] THYROID PEROXIDASE (TPO) AB [41593 CPT(R)] TSH 3RD GENERATION [05382 CPT(R)] Follow-up Instructions Return if symptoms worsen or fail to improve. Introducing [facility] & HEALTH SERVICES! Dear Shonda Yañez: Thank you for requesting a Chaologix account. Our records indicate that you already have an active Chaologix account. You can access your account anytime at [email or web address] Did you know that you can access your hospital and ER discharge instructions at any time in Chaologix? You can also review all of your test results from your hospital stay or ER visit. Additional Information If you have questions, please visit the Frequently Asked Questions section of the Chaologix website at [email or web address] Remember, Chaologix is NOT to be used for urgent needs. For medical emergencies, dial 911. Now available from your iPhone and Android! Please provide this summary of care documentation to your next provider. Your primary care clinician is listed as All Muniz. If you have any questions after today's visit, please call [phone].

## 2018-08-09 LAB
T4 FREE SERPL-MCNC: 1.48 NG/DL (ref 0.82–1.77)
THYROGLOB AB SERPL-ACNC: <1 IU/ML (ref 0–0.9)
THYROPEROXIDASE AB SERPL-ACNC: 7 IU/ML (ref 0–34)
TSH SERPL DL<=0.005 MIU/L-ACNC: 1.04 UIU/ML (ref 0.45–4.5)

## 2018-08-09 NOTE — PROGRESS NOTES
Thyroid levels and thyroid antibody are all normal,   TSH 1.04, FT4 1.48, TPO and TgAb negative,  Attempted to call patient, left general message for her to check Quick Hangt account for message,    Jose Ortiz.  [address] [facility]  [address] [place] [address]

## 2018-11-15 ENCOUNTER — OFFICE VISIT (OUTPATIENT)
Dept: PRIMARY CARE CLINIC | Age: 33
End: 2018-11-15

## 2018-11-15 VITALS
BODY MASS INDEX: 30.32 KG/M2 | SYSTOLIC BLOOD PRESSURE: 134 MMHG | OXYGEN SATURATION: 98 % | WEIGHT: 182 LBS | DIASTOLIC BLOOD PRESSURE: 89 MMHG | HEIGHT: 65 IN | RESPIRATION RATE: 15 BRPM | TEMPERATURE: 99.9 F | HEART RATE: 90 BPM

## 2018-11-15 DIAGNOSIS — J02.9 SORE THROAT: ICD-10-CM

## 2018-11-15 DIAGNOSIS — R49.0 VOICE HOARSENESS: ICD-10-CM

## 2018-11-15 DIAGNOSIS — R50.9 FEVER, UNSPECIFIED FEVER CAUSE: ICD-10-CM

## 2018-11-15 DIAGNOSIS — J02.0 STREP PHARYNGITIS: Primary | ICD-10-CM

## 2018-11-15 LAB
S PYO AG THROAT QL: POSITIVE
VALID INTERNAL CONTROL?: YES

## 2018-11-15 RX ORDER — AMOXICILLIN AND CLAVULANATE POTASSIUM 875; 125 MG/1; MG/1
1 TABLET, FILM COATED ORAL 2 TIMES DAILY
Qty: 20 TAB | Refills: 0 | Status: SHIPPED | OUTPATIENT
Start: 2018-11-15 | End: 2019-01-08 | Stop reason: ALTCHOICE

## 2018-11-15 NOTE — PROGRESS NOTES
Chief Complaint   Patient presents with   Katy Thompson     went on a quick trip and woke on tuesday with a sor throat, and then this morning she had no voice.

## 2018-11-15 NOTE — PROGRESS NOTES
[facility]   Rachael Chong [address] [facility], [address] [place] [address]  P: [phone]  F: [phone]      Chief Complaint   Patient presents with   Arville Dural     went on a quick trip and woke on tuesday with a sor throat, and then this morning she had no voice. Wolf Campuzano is a 29 y.o. female who presents to clinic for Hoarse (went on a quick trip and woke on tuesday with a sor throat, and then this morning she had no voice.  ). HPI:  Pt presents for acute care visit for sore throat. She states recent travel 5-7 ago days to [facility] and then developed sore throat on Tuesday. She states there were sick kids on airplane near her. She also shares this is the third time she has had a sore throat this year. She is concerned because she works in [employer] and has events this weekend. She has not taken advil or tylenol but has tried salt gargle, cough drops, lozenges, and tea. There are no active problems to display for this patient. Past Medical History:   Diagnosis Date    Adverse effect of anesthesia     slow to awake. slow to function    Asthma 10/20/11    CHILDHOOD; NO INHALER IN YEARS    Right groin pain 7/24/2017    Skin lesion of right lower extremity 8/7/2017     Past Surgical History:   Procedure Laterality Date    HX CYST REMOVAL  11/01/2010    inner right thigh    HX CYST REMOVAL  10/2011    HX CYST REMOVAL  8/16/13    Excision recurrent cyst, right groin    HX CYST REMOVAL  08/2017    inner right thigh    HX OTHER SURGICAL  11/10    EXCISION R GROIN CYST X2    HX OTHER SURGICAL  07/2017    wisdom teeth extraction    HX OTHER SURGICAL Right 08/23/2017    excision skin lesion right inner thigh by Dr. Paul Field.     HX WISDOM TEETH EXTRACTION  07/11/2017     Social History     Socioeconomic History    Marital status:      Spouse name: Not on file    Number of children: Not on file    Years of education: Not on file    Highest education level: Not on file   Social Needs    Financial resource strain: Not on file    Food insecurity - worry: Not on file    Food insecurity - inability: Not on file    Transportation needs - medical: Not on file   Emerging Travel needs - non-medical: Not on file   Occupational History    Not on file   Tobacco Use    Smoking status: Light Tobacco Smoker     Packs/day: 0.25     Years: 0.50     Pack years: 0.12     Last attempt to quit: 2011     Years since quittin.2    Smokeless tobacco: Never Used   Substance and Sexual Activity    Alcohol use: Yes     Comment: one time a week    Drug use: No    Sexual activity: Yes     Partners: Male     Birth control/protection: Pill   Other Topics Concern    Not on file   Social History Narrative    Not on file     Family History   Problem Relation Age of Onset    Asthma Mother     Hypertension Father     Diabetes Maternal Grandfather     Diabetes Paternal Grandmother     Cancer Paternal Grandfather         BRAIN TUMOR    Cancer Other         FGR GRANDFATHER-LUNG CA    Heart Disease Neg Hx     Stroke Neg Hx     Malignant Hyperthermia Neg Hx     Pseudocholinesterase Deficiency Neg Hx     Delayed Awakening Neg Hx     Post-op Nausea/Vomiting Neg Hx      Allergies   Allergen Reactions    Sulfa (Sulfonamide Antibiotics) Hives    Coconut Hives       Current Outpatient Medications   Medication Sig Dispense Refill    amoxicillin-clavulanate (AUGMENTIN) 875-125 mg per tablet Take 1 Tab by mouth two (2) times a day. 20 Tab 0    NORGESTIMATE-ETHINYL ESTRADIOL (TRINESSA LO PO) Take 1 Tab by mouth daily.  aspirin/acetaminophen/caffeine (EXCEDRIN MIGRAINE PO) Take  by mouth.  cholecalciferol, vitamin D3, (VITAMIN D3) 2,000 unit Tab Take 1 Tab by mouth daily.  MULTIVITAMIN PO Take  by mouth. The medications were reviewed and updated in the medical record.   The past medical history, past surgical history, and family history were reviewed and updated in the medical record. REVIEW OF SYSTEMS   Review of Systems   Constitutional: Positive for fever. Negative for malaise/fatigue. HENT: Positive for sore throat. Negative for congestion, ear pain and sinus pain. Eyes: Negative for blurred vision and pain. Respiratory: Negative for cough and shortness of breath. Cardiovascular: Negative for chest pain and palpitations. Gastrointestinal: Negative for abdominal pain and heartburn. Genitourinary: Negative for frequency and urgency. Musculoskeletal: Negative for joint pain and myalgias. Neurological: Negative for dizziness, tingling, sensory change, weakness and headaches. Psychiatric/Behavioral: Negative for depression, memory loss and substance abuse. PHYSICAL EXAM     Visit Vitals  /89 (BP 1 Location: Right arm, BP Patient Position: Sitting)   Pulse 90   Temp 99.9 °F (37.7 °C) (Oral)   Resp 15   Ht 5' 5\" (1.651 m)   Wt 182 lb (82.6 kg)   LMP 10/12/2018   SpO2 98%   BMI 30.29 kg/m²       Physical Exam   Constitutional: She has a sickly appearance. HENT:   Head: Normocephalic and atraumatic. Right Ear: Hearing, tympanic membrane, external ear and ear canal normal.   Left Ear: Hearing, tympanic membrane, external ear and ear canal normal.   Nose: Nose normal. Right sinus exhibits no maxillary sinus tenderness and no frontal sinus tenderness. Left sinus exhibits no maxillary sinus tenderness and no frontal sinus tenderness. Mouth/Throat: Posterior oropharyngeal edema and posterior oropharyngeal erythema present. Tonsils 3+    Cardiovascular: Normal rate, regular rhythm and normal heart sounds. Pulmonary/Chest: Effort normal and breath sounds normal.   Abdominal: Soft. Bowel sounds are normal.   Musculoskeletal: Normal range of motion. Lymphadenopathy:        Head (right side): Submandibular and tonsillar adenopathy present. Head (left side): Submandibular and tonsillar adenopathy present. Neurological: She is alert. Gait normal.   Skin: Skin is warm and dry. Psychiatric: Affect and judgment normal.   Nursing note and vitals reviewed. LABS/IMAGING     Rapid strep test is positive. ASSESSMENT/ PLAN   Diagnoses and all orders for this visit:    1. Strep pharyngitis  -     amoxicillin-clavulanate (AUGMENTIN) 875-125 mg per tablet; Take 1 Tab by mouth two (2) times a day. Take with food/ probiotic encouraged. - May continue supportive measures for throat including  tea, lozenges, salt gargle. She may take ibuprofen or tylenol as directed for pain. Encouraged fluids. 2. Sore throat  -     AMB POC RAPID STREP A  3. Fever, unspecified fever cause  -     AMB POC RAPID STREP A  4. Voice hoarseness  -     AMB POC RAPID STREP A      Deferred flu shot w/ strep and fever. Follow-up Disposition: Not on File    Disclaimer:  Advised patient to call back or return to office if symptoms worsen/change/persist.  Discussed expected course/resolution/complications of diagnosis in detail with patient.     Medication risks/benefits/alternatives discussed with patient. Patient was given an after visit summary which includes diagnoses, current medications, & vitals.      Discussed patient instructions and advised to read to all patient instructions regarding care.      Patient expressed understanding with the diagnosis and plan. This note will not be viewable in [address]         Qi Bryan NP  11/15/2018        (This document has been electronically signed)

## 2019-01-08 ENCOUNTER — OFFICE VISIT (OUTPATIENT)
Dept: PRIMARY CARE CLINIC | Age: 34
End: 2019-01-08

## 2019-01-08 VITALS
RESPIRATION RATE: 16 BRPM | HEART RATE: 87 BPM | WEIGHT: 182 LBS | DIASTOLIC BLOOD PRESSURE: 89 MMHG | BODY MASS INDEX: 30.32 KG/M2 | TEMPERATURE: 98.6 F | OXYGEN SATURATION: 96 % | SYSTOLIC BLOOD PRESSURE: 130 MMHG | HEIGHT: 65 IN

## 2019-01-08 DIAGNOSIS — J32.1 FRONTAL SINUSITIS, UNSPECIFIED CHRONICITY: Primary | ICD-10-CM

## 2019-01-08 RX ORDER — AMOXICILLIN AND CLAVULANATE POTASSIUM 875; 125 MG/1; MG/1
1 TABLET, FILM COATED ORAL 2 TIMES DAILY
Qty: 20 TAB | Refills: 0 | Status: SHIPPED | OUTPATIENT
Start: 2019-01-08 | End: 2019-01-18

## 2019-01-08 NOTE — PROGRESS NOTES
Chief Complaint   Patient presents with    Cold Symptoms     states that she noticed new years day and thought it was a cold. states that she has rossana much congestion in her head.

## 2019-01-08 NOTE — PROGRESS NOTES
[facility]   Rachael Chong [address], [address] [facility]  P: [phone]  F: [phone]      Chief Complaint   Patient presents with    Cold Symptoms     states that she noticed new years day and thought it was a cold. states that she has rossana much congestion in her head. Delores Ball is a 34 y.o. female who presents to clinic for Cold Symptoms (states that she noticed new years day and thought it was a cold. states that she has rossana much congestion in her head. ). HPI:  The patient presents with sinus congestion and headaches going on for about 9 days. She notes frontal sinus tenderness and cough from postnasal drip. She has been taking mucinex DM at night and Tyelnol sinus for a few days. No known sick contacts. Nonsmoker. Of note, did test positive for strep in November and was on Augmentin course. There are no active problems to display for this patient. Past Medical History:   Diagnosis Date    Adverse effect of anesthesia     slow to awake. slow to function    Asthma 10/20/11    CHILDHOOD; NO INHALER IN YEARS    Right groin pain 7/24/2017    Skin lesion of right lower extremity 8/7/2017     Past Surgical History:   Procedure Laterality Date    HX CYST REMOVAL  11/01/2010    inner right thigh    HX CYST REMOVAL  10/2011    HX CYST REMOVAL  8/16/13    Excision recurrent cyst, right groin    HX CYST REMOVAL  08/2017    inner right thigh    HX OTHER SURGICAL  11/10    EXCISION R GROIN CYST X2    HX OTHER SURGICAL  07/2017    wisdom teeth extraction    HX OTHER SURGICAL Right 08/23/2017    excision skin lesion right inner thigh by Dr. Josias Simms.     HX WISDOM TEETH EXTRACTION  07/11/2017     Social History     Socioeconomic History    Marital status:      Spouse name: Not on file    Number of children: Not on file    Years of education: Not on file    Highest education level: Not on file   Social Needs    Financial resource strain: Not on file    Food insecurity - worry: Not on file    Food insecurity - inability: Not on file    Transportation needs - medical: Not on file   [place] needs - non-medical: Not on file   Occupational History    Not on file   Tobacco Use    Smoking status: Light Tobacco Smoker     Packs/day: 0.25     Years: 0.50     Pack years: 0.12     Last attempt to quit: 2011     Years since quittin.4    Smokeless tobacco: Never Used   Substance and Sexual Activity    Alcohol use: Yes     Comment: one time a week    Drug use: No    Sexual activity: Yes     Partners: Male     Birth control/protection: Pill   Other Topics Concern    Not on file   Social History Narrative    Not on file     Family History   Problem Relation Age of Onset    Asthma Mother     Hypertension Father     Diabetes Maternal Grandfather     Diabetes Paternal Grandmother     Cancer Paternal Grandfather         BRAIN TUMOR    Cancer Other         FGR GRANDFATHER-LUNG CA    Heart Disease Neg Hx     Stroke Neg Hx     Malignant Hyperthermia Neg Hx     Pseudocholinesterase Deficiency Neg Hx     Delayed Awakening Neg Hx     Post-op Nausea/Vomiting Neg Hx      Allergies   Allergen Reactions    Sulfa (Sulfonamide Antibiotics) Hives    Coconut Hives       Current Outpatient Medications   Medication Sig Dispense Refill    amoxicillin-clavulanate (AUGMENTIN) 875-125 mg per tablet Take 1 Tab by mouth two (2) times a day for 10 days. 20 Tab 0    aspirin/acetaminophen/caffeine (EXCEDRIN MIGRAINE PO) Take  by mouth.  NORGESTIMATE-ETHINYL ESTRADIOL (TRINESSA LO PO) Take 1 Tab by mouth daily.  cholecalciferol, vitamin D3, (VITAMIN D3) 2,000 unit Tab Take 1 Tab by mouth daily.  MULTIVITAMIN PO Take  by mouth. The medications were reviewed and updated in the medical record. The past medical history, past surgical history, and family history were reviewed and updated in the medical record.     REVIEW OF SYSTEMS   Review of Systems   Constitutional: Negative for chills, fever and malaise/fatigue. HENT: Positive for congestion, ear pain and sinus pain. Eyes: Negative for blurred vision and pain. Respiratory: Negative for cough and shortness of breath. Cardiovascular: Negative for chest pain and palpitations. Gastrointestinal: Negative for abdominal pain and heartburn. Genitourinary: Negative for frequency and urgency. Musculoskeletal: Negative for joint pain and myalgias. Neurological: Negative for dizziness, tingling, sensory change, weakness and headaches. Psychiatric/Behavioral: Negative for depression, memory loss and substance abuse. PHYSICAL EXAM     Visit Vitals  /89 (BP 1 Location: Left arm, BP Patient Position: Sitting)   Pulse 87   Temp 98.6 °F (37 °C) (Oral)   Resp 16   Ht 5' 5\" (1.651 m)   Wt 182 lb (82.6 kg)   LMP 12/10/2018   SpO2 96%   BMI 30.29 kg/m²       Physical Exam   Constitutional: She is oriented to person, place, and time and well-developed, well-nourished, and in no distress. HENT:   Head: Normocephalic and atraumatic. Right Ear: Hearing, external ear and ear canal normal. Tympanic membrane is bulging. Left Ear: Hearing, external ear and ear canal normal. Tympanic membrane is bulging. Nose: Mucosal edema present. Right sinus exhibits frontal sinus tenderness. Left sinus exhibits frontal sinus tenderness. Mouth/Throat: Posterior oropharyngeal erythema present. Cardiovascular: Normal rate, regular rhythm and normal heart sounds. Pulmonary/Chest: Effort normal and breath sounds normal.   Musculoskeletal: Normal range of motion. She exhibits no edema. Neurological: She is alert and oriented to person, place, and time. Gait normal.   Skin: Skin is warm and dry. Psychiatric: Affect and judgment normal.   Nursing note and vitals reviewed. ASSESSMENT/ PLAN   Diagnoses and all orders for this visit:    1.  Frontal sinusitis, unspecified chronicity  -     amoxicillin-clavulanate (AUGMENTIN) 875-125 mg per tablet; Take 1 Tab by mouth two (2) times a day for 10 days. Sinus Treatments:  1. Nasal rinses:  Saline rinses or salt water rinses or Neti pot  2. Anti-histamines: allegra (fenofexadine) or claritn (loratidine) or zyrtec (certizine)  3. Nasal steroids: Nasacort and Flonase (are over the counter & prescription)    Follow-up Disposition:  Return if symptoms worsen or fail to improve, for Sinus Infection. Disclaimer:  Advised patient to call back or return to office if symptoms worsen/change/persist.  Discussed expected course/resolution/complications of diagnosis in detail with patient.     Medication risks/benefits/alternatives discussed with patient. Patient was given an after visit summary which includes diagnoses, current medications, & vitals.      Discussed patient instructions and advised to read to all patient instructions regarding care.      Patient expressed understanding with the diagnosis and plan. This note will not be viewable in [address]         Liban Tripp NP  1/8/2019        (This document has been electronically signed)

## 2019-04-16 ENCOUNTER — OFFICE VISIT (OUTPATIENT)
Dept: PRIMARY CARE CLINIC | Age: 34
End: 2019-04-16

## 2019-04-16 VITALS
OXYGEN SATURATION: 99 % | WEIGHT: 180.2 LBS | TEMPERATURE: 99.3 F | RESPIRATION RATE: 16 BRPM | HEIGHT: 65 IN | HEART RATE: 84 BPM | DIASTOLIC BLOOD PRESSURE: 87 MMHG | BODY MASS INDEX: 30.02 KG/M2 | SYSTOLIC BLOOD PRESSURE: 123 MMHG

## 2019-04-16 DIAGNOSIS — J01.01 ACUTE RECURRENT MAXILLARY SINUSITIS: Primary | ICD-10-CM

## 2019-04-16 DIAGNOSIS — R09.81 NASAL CONGESTION: ICD-10-CM

## 2019-04-16 DIAGNOSIS — N90.7 VULVAR CYSTS: ICD-10-CM

## 2019-04-16 DIAGNOSIS — N92.0 MENORRHAGIA WITH REGULAR CYCLE: ICD-10-CM

## 2019-04-16 RX ORDER — NORGESTIMATE AND ETHINYL ESTRADIOL 7DAYSX3 LO
1 KIT ORAL DAILY
Qty: 30 TAB | Refills: 6 | Status: SHIPPED | OUTPATIENT
Start: 2019-04-16 | End: 2020-02-04

## 2019-04-16 RX ORDER — MOMETASONE FUROATE 50 UG/1
2 SPRAY, METERED NASAL DAILY
Qty: 1 CONTAINER | Refills: 1 | Status: SHIPPED | OUTPATIENT
Start: 2019-04-16 | End: 2019-04-23

## 2019-04-16 RX ORDER — AMOXICILLIN AND CLAVULANATE POTASSIUM 875; 125 MG/1; MG/1
1 TABLET, FILM COATED ORAL EVERY 12 HOURS
Qty: 20 TAB | Refills: 0 | Status: SHIPPED | OUTPATIENT
Start: 2019-04-16 | End: 2019-04-26

## 2019-04-16 NOTE — PROGRESS NOTES
Written by Angie Keith, as dictated by Dr. Makenzie Solitario MD. 
 
[address] [facility] Trav Georges is a 34 y.o. female. HPI The patient presents today c/o nasal congestion which started last week and she originally attributed to seasonal allergies. Pt had been taking Zyrtec, but notes that she stopped it for 1 week. After resuming Zyrtec, her congestion worsened and she also had ear fullness and sinus pressure. Her mucous has gone from clear to green. Patient prefers not to use nasal sprays. She notes that she has 2 R inguinal cysts, which she notes continue to recur. Sometimes the cysts are painful. Patient has had 4 surgeries in the past 7 years and is reluctant to have another surgery as each time is seems it is more difficult for her to come out of anesthesia. The pt's surgeries were performed by Dr. Mami Vazquez (general surgery). Patient would like a refill of Tanisha Akersits learning, which has been working well for her. She notes that her menstrual cycle alternates between heavy and normal. Her menstrual cycle is regular. The patient quit smoking years ago. Patient Active Problem List  
Diagnosis Code  
(none) - all problems resolved or deleted Current Outpatient Medications on File Prior to Visit Medication Sig Dispense Refill  aspirin/acetaminophen/caffeine (EXCEDRIN MIGRAINE PO) Take  by mouth.  cholecalciferol, vitamin D3, (VITAMIN D3) 2,000 unit Tab Take 1 Tab by mouth daily.  NORGESTIMATE-ETHINYL ESTRADIOL (TRINESSA LO PO) Take 1 Tab by mouth daily.  MULTIVITAMIN PO Take  by mouth. No current facility-administered medications on file prior to visit. Allergies Allergen Reactions  Sulfa (Sulfonamide Antibiotics) Hives  Coconut Hives Past Medical History:  
Diagnosis Date  Adverse effect of anesthesia   
 slow to awake. slow to function  Asthma 10/20/11 CHILDHOOD; NO INHALER IN YEARS  Right groin pain 7/24/2017  Skin lesion of right lower extremity 2017 Past Surgical History:  
Procedure Laterality Date  HX CYST REMOVAL  2010  
 inner right thigh  HX CYST REMOVAL  10/2011  HX CYST REMOVAL  13 Excision recurrent cyst, right groin  HX CYST REMOVAL  2017  
 inner right thigh  HX OTHER SURGICAL  11/10 EXCISION R GROIN CYST X2  
 HX OTHER SURGICAL  2017  
 wisdom teeth extraction  HX OTHER SURGICAL Right 2017  
 excision skin lesion right inner thigh by Dr. Chino Taylor.  HX WISDOM TEETH EXTRACTION  2017 Family History Problem Relation Age of Onset  Asthma Mother  Hypertension Father  Diabetes Maternal Grandfather  Diabetes Paternal Grandmother  Cancer Paternal Grandfather BRAIN TUMOR  Cancer Other FGR GRANDFATHER-LUNG CA  
 Heart Disease Neg Hx  Stroke Neg Hx  Malignant Hyperthermia Neg Hx  Pseudocholinesterase Deficiency Neg Hx  Delayed Awakening Neg Hx  Post-op Nausea/Vomiting Neg Hx Social History Socioeconomic History  Marital status:  Spouse name: Not on file  Number of children: Not on file  Years of education: Not on file  Highest education level: Not on file Occupational History  Not on file Social Needs  Financial resource strain: Not on file  Food insecurity:  
  Worry: Not on file Inability: Not on file  Transportation needs:  
  Medical: Not on file Non-medical: Not on file Tobacco Use  Smoking status: Light Tobacco Smoker Packs/day: 0.25 Years: 0.50 Pack years: 0.12 Last attempt to quit: 2011 Years since quittin.6  Smokeless tobacco: Never Used Substance and Sexual Activity  Alcohol use: Yes Comment: one time a week  Drug use: No  
 Sexual activity: Yes  
  Partners: Male Birth control/protection: Pill Lifestyle  Physical activity:  
  Days per week: Not on file Minutes per session: Not on file  Stress: Not on file Relationships  Social connections:  
  Talks on phone: Not on file Gets together: Not on file Attends [employer] service: Not on file Active member of club or organization: Not on file Attends meetings of clubs or organizations: Not on file Relationship status: Not on file  Intimate partner violence:  
  Fear of current or ex partner: Not on file Emotionally abused: Not on file Physically abused: Not on file Forced sexual activity: Not on file Other Topics Concern  Not on file Social History Narrative  Not on file Review of Systems HENT: Positive for congestion, ear pain (BL fullness) and sinus pain (BL). Respiratory: Negative for cough and shortness of breath. Genitourinary: Negative for frequency and urgency. +menorrhagia Musculoskeletal: Negative for joint pain and myalgias. Skin:  
     +2 R inguinal cysts Neurological: Negative for dizziness, tingling, sensory change, weakness and headaches. Endo/Heme/Allergies: Positive for environmental allergies. Psychiatric/Behavioral: Negative for depression, memory loss and substance abuse. Visit Vitals /87 (BP 1 Location: Left arm, BP Patient Position: Sitting) Pulse 84 Temp 99.3 °F (37.4 °C) (Oral) Resp 16 Ht 5' 5\" (1.651 m) Wt 180 lb 3.2 oz (81.7 kg) LMP 04/16/2019 SpO2 99% BMI 29.99 kg/m² Physical Exam  
Constitutional: She is oriented to person, place, and time. She appears well-developed and well-nourished. No distress. HENT:  
Right Ear: External ear and ear canal normal. Tympanic membrane is not erythematous. Left Ear: External ear and ear canal normal. Tympanic membrane is not erythematous. Nose: Right sinus exhibits maxillary sinus tenderness. Left sinus exhibits maxillary sinus tenderness.   
Mouth/Throat: Oropharynx is clear and moist.  
BL fluid behind TM  
 Eyes: Conjunctivae and EOM are normal. Right eye exhibits no discharge. Left eye exhibits no discharge. Neck: Normal range of motion. Neck supple. Cardiovascular: Normal rate, regular rhythm and normal heart sounds. Pulmonary/Chest: Effort normal and breath sounds normal. She has no wheezes. Abdominal: Soft. Bowel sounds are normal. There is no tenderness. Lymphadenopathy:  
  She has cervical adenopathy. Neurological: She is alert and oriented to person, place, and time. Skin: She is not diaphoretic. Psychiatric: She has a normal mood and affect. Her behavior is normal.  
Nursing note and vitals reviewed. ASSESSMENT and PLAN 
  ICD-10-CM ICD-9-CM 1. Acute recurrent maxillary sinusitis J01.01 461.0 amoxicillin-clavulanate (AUGMENTIN) 875-125 mg per tablet sent to pharmacy. Augmentin 875-125 mg prescribed. 2. Vulvar cysts N90.7 624.8 Advised pt to contact Dr. Campos Stock (general surgery) and discuss that she does not want to have general anesthesia. 3. Nasal congestion R09.81 478.19 mometasone (NASONEX) 50 mcg/actuation nasal spray sent to pharmacy. Nasonex prescribed. 4. Menorrhagia with regular cycle N92.0 626.2 norgestimate-ethinyl estradiol (TRINESSA LO) 0.18/0.215/0.25 mg-25 mcg tab sent to pharmacy. Twyla Serge LO refilled. This plan was reviewed with the patient and patient agrees. All questions were answered. This scribe documentation was reviewed by me and accurately reflects the examination and decisions made by me. This note will not be viewable in [address]

## 2019-04-16 NOTE — PROGRESS NOTES
Chief Complaint Patient presents with  Nasal Congestion  
  started to have symptoms last wednesday and took zyrtec and mucus has gone from clear to green. also has some cyst and wants to discuss birthcontrol

## 2019-08-14 ENCOUNTER — OFFICE VISIT (OUTPATIENT)
Dept: PRIMARY CARE CLINIC | Age: 34
End: 2019-08-14

## 2019-08-14 VITALS
SYSTOLIC BLOOD PRESSURE: 132 MMHG | HEART RATE: 87 BPM | BODY MASS INDEX: 30.99 KG/M2 | RESPIRATION RATE: 16 BRPM | HEIGHT: 65 IN | OXYGEN SATURATION: 97 % | WEIGHT: 186 LBS | TEMPERATURE: 98.4 F | DIASTOLIC BLOOD PRESSURE: 88 MMHG

## 2019-08-14 DIAGNOSIS — H61.22 IMPACTED CERUMEN, LEFT EAR: ICD-10-CM

## 2019-08-14 DIAGNOSIS — H92.02 DISCOMFORT OF LEFT EAR: Primary | ICD-10-CM

## 2019-08-14 NOTE — PROGRESS NOTES
Identified pt with two pt identifiers(name and ). Reviewed record in preparation for visit and have obtained necessary documentation. Chief Complaint   Patient presents with    Ear Pain     pain in ear since yesterday AM        Health Maintenance Due   Topic    Pneumococcal 0-64 years (1 of 1 - PPSV23)    Influenza Age 5 to Adult        Coordination of Care Questionnaire:   1) Have you been to an emergency room, urgent care, or hospitalized since your last visit? If yes, where when, and reason for visit? no       2. Have seen or consulted any other health care provider since your last visit? If yes, where when, and reason for visit? NO      3) Do you have an Advanced Directive/ Living Will in place? NO  If yes, do we have a copy on file NO  If no, would you like information NO    Patient is accompanied by self I have received verbal consent from Jennifer Duarte to discuss any/all medical information while they are present in the room.     Visit Vitals  /88 (BP 1 Location: Left arm, BP Patient Position: Sitting)   Pulse 87   Temp 98.4 °F (36.9 °C) (Oral)   Resp 16   Ht 5' 5\" (1.651 m)   Wt 186 lb (84.4 kg)   SpO2 97%   BMI 30.95 kg/m²

## 2019-08-14 NOTE — PROGRESS NOTES
[facility]   [address] [facility] [place], [address] [place] [address]  P: [phone]  F: [phone]      Chief Complaint   Patient presents with    Ear Pain     pain in ear since yesterday AM       Jonas Vazquez is a 34 y.o. female who presents to clinic for Ear Pain (pain in ear since yesterday AM). HPI:    Liz Solitario is a 34 yr old female who presents for left ear pain worsening over last 1-2 days. She endorses left ear itching and discomfort for 6+ months. Denies any other symptoms today. Prior hx of strep throat, but no sore throat, fevers, or sinus congestion today. No known sick contacts. There are no active problems to display for this patient. Past Medical History:   Diagnosis Date    Adverse effect of anesthesia     slow to awake. slow to function    Asthma 10/20/11    CHILDHOOD; NO INHALER IN YEARS    Right groin pain 7/24/2017    Skin lesion of right lower extremity 8/7/2017     Past Surgical History:   Procedure Laterality Date    HX CYST REMOVAL  11/01/2010    inner right thigh    HX CYST REMOVAL  10/2011    HX CYST REMOVAL  8/16/13    Excision recurrent cyst, right groin    HX CYST REMOVAL  08/2017    inner right thigh    HX OTHER SURGICAL  11/10    EXCISION R GROIN CYST X2    HX OTHER SURGICAL  07/2017    wisdom teeth extraction    HX OTHER SURGICAL Right 08/23/2017    excision skin lesion right inner thigh by Dr. Olinda Callahan.     HX WISDOM TEETH EXTRACTION  07/11/2017     Social History     Socioeconomic History    Marital status:      Spouse name: Not on file    Number of children: Not on file    Years of education: Not on file    Highest education level: Not on file   Occupational History    Not on file   Social Needs    Financial resource strain: Not on file    Food insecurity:     Worry: Not on file     Inability: Not on file    Transportation needs:     Medical: Not on file     Non-medical: Not on file   Tobacco Use    Smoking status: Former Smoker     Packs/day: 0.25     Years: 0.50     Pack years: 0.12     Last attempt to quit: 2011     Years since quittin.0    Smokeless tobacco: Never Used   Substance and Sexual Activity    Alcohol use: Yes     Comment: one time a week    Drug use: No    Sexual activity: Yes     Partners: Male     Birth control/protection: Pill   Lifestyle    Physical activity:     Days per week: Not on file     Minutes per session: Not on file    Stress: Not on file   Relationships    Social connections:     Talks on phone: Not on file     Gets together: Not on file     Attends [employer] service: Not on file     Active member of club or organization: Not on file     Attends meetings of clubs or organizations: Not on file     Relationship status: Not on file    Intimate partner violence:     Fear of current or ex partner: Not on file     Emotionally abused: Not on file     Physically abused: Not on file     Forced sexual activity: Not on file   Other Topics Concern    Not on file   Social History Narrative    Not on file     Family History   Problem Relation Age of Onset    Asthma Mother     Hypertension Father     Diabetes Maternal Grandfather     Diabetes Paternal Grandmother     Cancer Paternal Grandfather         BRAIN TUMOR    Cancer Other         FGR GRANDFATHER-LUNG CA    Heart Disease Neg Hx     Stroke Neg Hx     Malignant Hyperthermia Neg Hx     Pseudocholinesterase Deficiency Neg Hx     Delayed Awakening Neg Hx     Post-op Nausea/Vomiting Neg Hx      Allergies   Allergen Reactions    Sulfa (Sulfonamide Antibiotics) Hives    Coconut Hives       Current Outpatient Medications   Medication Sig Dispense Refill    carbamide peroxide (DEBROX) 6.5 % otic solution Administer 5 Drops into each ear two (2) times a day. 7.5 mL 0    aspirin/acetaminophen/caffeine (EXCEDRIN MIGRAINE PO) Take  by mouth.  NORGESTIMATE-ETHINYL ESTRADIOL (TRINESSA LO PO) Take 1 Tab by mouth daily.       cholecalciferol, vitamin D3, (VITAMIN D3) 2,000 unit Tab Take 1 Tab by mouth daily.  MULTIVITAMIN PO Take  by mouth. The medications were reviewed and updated in the medical record. The past medical history, past surgical history, and family history were reviewed and updated in the medical record. REVIEW OF SYSTEMS   Review of Systems   Constitutional: Negative for malaise/fatigue. HENT: Positive for ear pain. Negative for congestion. Eyes: Negative for blurred vision and pain. Respiratory: Negative for cough and shortness of breath. Cardiovascular: Negative for chest pain and palpitations. Gastrointestinal: Negative for abdominal pain and heartburn. Genitourinary: Negative for frequency and urgency. Musculoskeletal: Negative for joint pain and myalgias. Neurological: Negative for dizziness, tingling, sensory change, weakness and headaches. Psychiatric/Behavioral: Negative for depression, memory loss and substance abuse. PHYSICAL EXAM     Visit Vitals  /88 (BP 1 Location: Left arm, BP Patient Position: Sitting)   Pulse 87   Temp 98.4 °F (36.9 °C) (Oral)   Resp 16   Ht 5' 5\" (1.651 m)   Wt 186 lb (84.4 kg)   LMP 08/02/2019 (Approximate)   SpO2 97%   BMI 30.95 kg/m²       Physical Exam   Constitutional: She is oriented to person, place, and time and well-developed, well-nourished, and in no distress. HENT:   Head: Normocephalic and atraumatic. Right Ear: Hearing, tympanic membrane, external ear and ear canal normal.   Left Ear: Hearing, tympanic membrane, external ear and ear canal normal.   Biltateral ears w/ excessive cerumen. Both TMs are visible and pearly grey. Cardiovascular: Normal rate, regular rhythm and normal heart sounds. Pulmonary/Chest: Effort normal and breath sounds normal.   Musculoskeletal: Normal range of motion. She exhibits no edema. Neurological: She is alert and oriented to person, place, and time.  Gait normal.   Skin: Skin is warm and dry. Psychiatric: Affect and judgment normal.   Nursing note and vitals reviewed. ASSESSMENT/ PLAN   Diagnoses and all orders for this visit:    1. Discomfort of left ear  -     REMOVAL IMPACTED CERUMEN IRRIGATION/LVG UNILAT  -     carbamide peroxide (DEBROX) 6.5 % otic solution; Administer 5 Drops into each ear two (2) times a day. 2. Impacted cerumen, left ear  -     REMOVAL IMPACTED CERUMEN IRRIGATION/LVG UNILAT  -     carbamide peroxide (DEBROX) 6.5 % otic solution; Administer 5 Drops into each ear two (2) times a day. Encouraged debrox to help with wax, no concerning signs of infection today. Follow-up if ear pain is not improving. Disclaimer:  Advised patient to call back or return to office if symptoms worsen/change/persist.  Discussed expected course/resolution/complications of diagnosis in detail with patient.     Medication risks/benefits/alternatives discussed with patient. Patient was given an after visit summary which includes diagnoses, current medications, & vitals.      Discussed patient instructions and advised to read to all patient instructions regarding care.      Patient expressed understanding with the diagnosis and plan. This note will not be viewable in [address]         Ivory Paige NP  8/14/2019        (This document has been electronically signed)

## 2019-12-05 ENCOUNTER — OFFICE VISIT (OUTPATIENT)
Dept: URGENT CARE | Age: 34
End: 2019-12-05

## 2019-12-05 VITALS
WEIGHT: 188 LBS | HEIGHT: 65 IN | TEMPERATURE: 99 F | DIASTOLIC BLOOD PRESSURE: 75 MMHG | SYSTOLIC BLOOD PRESSURE: 136 MMHG | HEART RATE: 75 BPM | OXYGEN SATURATION: 98 % | RESPIRATION RATE: 14 BRPM | BODY MASS INDEX: 31.32 KG/M2

## 2019-12-05 DIAGNOSIS — J06.9 VIRAL UPPER RESPIRATORY INFECTION: Primary | ICD-10-CM

## 2019-12-05 DIAGNOSIS — J02.9 SORE THROAT: ICD-10-CM

## 2019-12-05 LAB
FLUAV+FLUBV AG NOSE QL IA.RAPID: NEGATIVE POS/NEG
FLUAV+FLUBV AG NOSE QL IA.RAPID: NEGATIVE POS/NEG
S PYO AG THROAT QL: NEGATIVE
VALID INTERNAL CONTROL?: YES
VALID INTERNAL CONTROL?: YES

## 2019-12-05 RX ORDER — LIDOCAINE HYDROCHLORIDE 20 MG/ML
5 SOLUTION OROPHARYNGEAL
Qty: 100 ML | Refills: 0 | Status: SHIPPED | OUTPATIENT
Start: 2019-12-05 | End: 2021-09-10 | Stop reason: ALTCHOICE

## 2019-12-05 RX ORDER — BENZONATATE 200 MG/1
200 CAPSULE ORAL
Qty: 30 CAP | Refills: 0 | Status: SHIPPED | OUTPATIENT
Start: 2019-12-05 | End: 2021-09-10 | Stop reason: ALTCHOICE

## 2019-12-05 NOTE — PROGRESS NOTES
Sore Throat    The history is provided by the patient. This is a new problem. The current episode started 2 days ago. The problem has been gradually worsening. Patient reports a subjective fever - was not measured. The fever has been present for 1 - 2 days. Associated symptoms include congestion, swollen glands, trouble swallowing and cough. Pertinent negatives include no vomiting, no ear pain, no headaches, no shortness of breath and no stridor. Treatments tried: dayquil/nyquil. The treatment provided no relief. Past Medical History:   Diagnosis Date    Adverse effect of anesthesia     slow to awake. slow to function    Asthma 10/20/11    CHILDHOOD; NO INHALER IN YEARS    Right groin pain 7/24/2017    Skin lesion of right lower extremity 8/7/2017        Past Surgical History:   Procedure Laterality Date    HX CYST REMOVAL  11/01/2010    inner right thigh    HX CYST REMOVAL  10/2011    HX CYST REMOVAL  8/16/13    Excision recurrent cyst, right groin    HX CYST REMOVAL  08/2017    inner right thigh    HX OTHER SURGICAL  11/10    EXCISION R GROIN CYST X2    HX OTHER SURGICAL  07/2017    wisdom teeth extraction    HX OTHER SURGICAL Right 08/23/2017    excision skin lesion right inner thigh by Dr. Maite Penaloza.     HX WISDOM TEETH EXTRACTION  07/11/2017         Family History   Problem Relation Age of Onset    Asthma Mother     Hypertension Father     Diabetes Maternal Grandfather     Diabetes Paternal Grandmother     Cancer Paternal Grandfather         BRAIN TUMOR    Cancer Other         FGR GRANDFATHER-LUNG CA    Heart Disease Neg Hx     Stroke Neg Hx     Malignant Hyperthermia Neg Hx     Pseudocholinesterase Deficiency Neg Hx     Delayed Awakening Neg Hx     Post-op Nausea/Vomiting Neg Hx         Social History     Socioeconomic History    Marital status:      Spouse name: Not on file    Number of children: Not on file    Years of education: Not on file    Highest education level: Not on file   Occupational History    Not on file   Social Needs    Financial resource strain: Not on file    Food insecurity:     Worry: Not on file     Inability: Not on file    Transportation needs:     Medical: Not on file     Non-medical: Not on file   Tobacco Use    Smoking status: Former Smoker     Packs/day: 0.25     Years: 0.50     Pack years: 0.12     Last attempt to quit: 2011     Years since quittin.3    Smokeless tobacco: Never Used   Substance and Sexual Activity    Alcohol use: Yes     Comment: one time a week    Drug use: No    Sexual activity: Yes     Partners: Male     Birth control/protection: Pill   Lifestyle    Physical activity:     Days per week: Not on file     Minutes per session: Not on file    Stress: Not on file   Relationships    Social connections:     Talks on phone: Not on file     Gets together: Not on file     Attends Voodoo service: Not on file     Active member of club or organization: Not on file     Attends meetings of clubs or organizations: Not on file     Relationship status: Not on file    Intimate partner violence:     Fear of current or ex partner: Not on file     Emotionally abused: Not on file     Physically abused: Not on file     Forced sexual activity: Not on file   Other Topics Concern    Not on file   Social History Narrative    Not on file                ALLERGIES: Sulfa (sulfonamide antibiotics) and Coconut    Review of Systems   Constitutional: Positive for chills and fever. Negative for activity change and appetite change. HENT: Positive for congestion, rhinorrhea, sore throat and trouble swallowing. Negative for ear pain, sinus pressure and sinus pain. Respiratory: Positive for cough. Negative for shortness of breath, wheezing and stridor. Cardiovascular: Negative for chest pain and palpitations. Gastrointestinal: Negative for nausea and vomiting. Musculoskeletal: Positive for myalgias.    Neurological: Negative for dizziness and headaches. Hematological: Positive for adenopathy. Vitals:    12/05/19 1158 12/05/19 1200   BP:  136/75   Pulse:  75   Resp:  14   Temp:  99 °F (37.2 °C)   SpO2:  98%   Weight: 188 lb (85.3 kg)    Height: 5' 5\" (1.651 m)        Physical Exam  Vitals signs and nursing note reviewed. Constitutional:       General: She is not in acute distress. Appearance: She is well-developed. She is not diaphoretic. HENT:      Right Ear: Tympanic membrane, ear canal and external ear normal.      Left Ear: Tympanic membrane, ear canal and external ear normal.      Nose: Congestion and rhinorrhea present. Right Sinus: No maxillary sinus tenderness or frontal sinus tenderness. Left Sinus: No maxillary sinus tenderness or frontal sinus tenderness. Mouth/Throat:      Pharynx: Pharyngeal swelling and posterior oropharyngeal erythema present. No oropharyngeal exudate. Tonsils: No tonsillar abscesses. Cardiovascular:      Rate and Rhythm: Normal rate and regular rhythm. Heart sounds: Normal heart sounds. Pulmonary:      Effort: Pulmonary effort is normal. No respiratory distress. Breath sounds: Normal breath sounds. No wheezing or rales. Lymphadenopathy:      Cervical: Cervical adenopathy present. Neurological:      Mental Status: She is alert. Psychiatric:         Behavior: Behavior normal.         Thought Content: Thought content normal.         Judgment: Judgment normal.         MDM    ICD-10-CM ICD-9-CM   1. Viral upper respiratory infection J06.9 465.9   2. Sore throat J02.9 462       Orders Placed This Encounter    UPPER RESPIRATORY CULTURE    AMB POC RAPID STREP A    AMB POC AYE INFLUENZA A/B TEST    lidocaine (LIDOCAINE VISCOUS) 2 % solution     Sig: Take 5 mL by mouth every three (3) hours as needed for Pain.  With a sip of water, gargle for 30-60 seconds then spit     Dispense:  100 mL     Refill:  0    benzonatate (TESSALON) 200 mg capsule     Sig: Take 1 Cap by mouth three (3) times daily as needed for Cough. Dispense:  30 Cap     Refill:  0        The patient is to follow up with PCP [facility]. If signs and symptoms become worse the pt is to go to the ER.          Results for orders placed or performed in visit on 12/05/19   AMB POC RAPID STREP A   Result Value Ref Range    VALID INTERNAL CONTROL POC Yes     Group A Strep Ag Negative Negative   AMB POC AYE INFLUENZA A/B TEST   Result Value Ref Range    VALID INTERNAL CONTROL POC Yes     Influenza A Ag POC Negative Negative Pos/Neg    Influenza B Ag POC Negative Negative Pos/Neg     Procedures

## 2019-12-05 NOTE — PATIENT INSTRUCTIONS
Viral Respiratory Infection: Care Instructions Your Care Instructions Viruses are very small organisms. They grow in number after they enter your body. There are many types that cause different illnesses, such as colds and the mumps. The symptoms of a viral respiratory infection often start quickly. They include a fever, sore throat, and runny nose. You may also just not feel well. Or you may not want to eat much. Most viral respiratory infections are not serious. They usually get better with time and self-care. Antibiotics are not used to treat a viral infection. That's because antibiotics will not help cure a viral illness. In some cases, antiviral medicine can help your body fight a serious viral infection. Follow-up care is a key part of your treatment and safety. Be sure to make and go to all appointments, and call your doctor if you are having problems. It's also a good idea to know your test results and keep a list of the medicines you take. How can you care for yourself at home? · Rest as much as possible until you feel better. · Be safe with medicines. Take your medicine exactly as prescribed. Call your doctor if you think you are having a problem with your medicine. You will get more details on the specific medicine your doctor prescribes. · Take an over-the-counter pain medicine, such as acetaminophen (Tylenol), ibuprofen (Advil, Motrin), or naproxen (Aleve), as needed for pain and fever. Read and follow all instructions on the label. Do not give aspirin to anyone younger than 20. It has been linked to Reye syndrome, a serious illness. · Drink plenty of fluids, enough so that your urine is light yellow or clear like water. Hot fluids, such as tea or soup, may help relieve congestion in your nose and throat. If you have kidney, heart, or liver disease and have to limit fluids, talk with your doctor before you increase the amount of fluids you drink. · Try to clear mucus from your lungs by breathing deeply and coughing. · Gargle with warm salt water once an hour. This can help reduce swelling and throat pain. Use 1 teaspoon of salt mixed in 1 cup of warm water. · Do not smoke or allow others to smoke around you. If you need help quitting, talk to your doctor about stop-smoking programs and medicines. These can increase your chances of quitting for good. To avoid spreading the virus · Cough or sneeze into a tissue. Then throw the tissue away. · If you don't have a tissue, use your hand to cover your cough or sneeze. Then clean your hand. You can also cough into your sleeve. · Wash your hands often. Use soap and warm water. Wash for 15 to 20 seconds each time. · If you don't have soap and water near you, you can clean your hands with alcohol wipes or gel. When should you call for help? Call your doctor now or seek immediate medical care if: 
  · You have a new or higher fever.  
  · Your fever lasts more than 48 hours.  
  · You have trouble breathing.  
  · You have a fever with a stiff neck or a severe headache.  
  · You are sensitive to light.  
  · You feel very sleepy or confused.  
 Watch closely for changes in your health, and be sure to contact your doctor if: 
  · You do not get better as expected. Where can you learn more? Go to http://arianna-camila.info/. Enter H911 in the search box to learn more about \"Viral Respiratory Infection: Care Instructions. \" Current as of: June 9, 2019 Content Version: 12.2 © 2645-8545 [employer]. Care instructions adapted under license by [employer] (which disclaims liability or warranty for this information). If you have questions about a medical condition or this instruction, always ask your healthcare professional. [address] any warranty or liability for your use of this information.

## 2019-12-09 LAB — BACTERIA SPEC RESP CULT: NORMAL

## 2020-02-04 DIAGNOSIS — N92.0 MENORRHAGIA WITH REGULAR CYCLE: ICD-10-CM

## 2020-02-04 RX ORDER — NORGESTIMATE AND ETHINYL ESTRADIOL 7DAYSX3 LO
KIT ORAL
Qty: 28 TAB | Refills: 0 | Status: SHIPPED | OUTPATIENT
Start: 2020-02-04 | End: 2020-03-15

## 2020-03-14 DIAGNOSIS — N92.0 MENORRHAGIA WITH REGULAR CYCLE: ICD-10-CM

## 2020-03-15 RX ORDER — NORGESTIMATE AND ETHINYL ESTRADIOL 7DAYSX3 LO
KIT ORAL
Qty: 28 TAB | Refills: 0 | Status: SHIPPED | OUTPATIENT
Start: 2020-03-15 | End: 2020-04-08

## 2020-04-07 DIAGNOSIS — N92.0 MENORRHAGIA WITH REGULAR CYCLE: ICD-10-CM

## 2020-04-08 RX ORDER — NORGESTIMATE AND ETHINYL ESTRADIOL 7DAYSX3 LO
KIT ORAL
Qty: 28 TAB | Refills: 0 | Status: SHIPPED | OUTPATIENT
Start: 2020-04-08 | End: 2020-05-08

## 2020-06-04 DIAGNOSIS — N92.0 MENORRHAGIA WITH REGULAR CYCLE: ICD-10-CM

## 2020-06-04 RX ORDER — NORGESTIMATE AND ETHINYL ESTRADIOL 7DAYSX3 LO
KIT ORAL
Qty: 28 TAB | Refills: 0 | Status: SHIPPED | OUTPATIENT
Start: 2020-06-04 | End: 2020-07-03

## 2020-07-03 DIAGNOSIS — N92.0 MENORRHAGIA WITH REGULAR CYCLE: ICD-10-CM

## 2020-07-03 RX ORDER — NORGESTIMATE AND ETHINYL ESTRADIOL 7DAYSX3 LO
KIT ORAL
Qty: 28 TAB | Refills: 0 | Status: SHIPPED | OUTPATIENT
Start: 2020-07-03 | End: 2020-08-07 | Stop reason: SDUPTHER

## 2020-11-19 DIAGNOSIS — N92.0 MENORRHAGIA WITH REGULAR CYCLE: ICD-10-CM

## 2020-11-19 RX ORDER — NORGESTIMATE AND ETHINYL ESTRADIOL 7DAYSX3 LO
KIT ORAL
Qty: 28 TAB | Refills: 0 | Status: SHIPPED | OUTPATIENT
Start: 2020-11-19 | End: 2021-02-03

## 2021-02-03 ENCOUNTER — OFFICE VISIT (OUTPATIENT)
Dept: PRIMARY CARE CLINIC | Age: 36
End: 2021-02-03
Payer: COMMERCIAL

## 2021-02-03 VITALS
HEART RATE: 67 BPM | BODY MASS INDEX: 29.62 KG/M2 | TEMPERATURE: 97.8 F | RESPIRATION RATE: 18 BRPM | OXYGEN SATURATION: 96 % | WEIGHT: 177.8 LBS | SYSTOLIC BLOOD PRESSURE: 124 MMHG | HEIGHT: 65 IN | DIASTOLIC BLOOD PRESSURE: 80 MMHG

## 2021-02-03 DIAGNOSIS — E55.9 VITAMIN D DEFICIENCY: ICD-10-CM

## 2021-02-03 DIAGNOSIS — N92.0 MENORRHAGIA WITH REGULAR CYCLE: ICD-10-CM

## 2021-02-03 DIAGNOSIS — Z01.419 WELL FEMALE EXAM WITH ROUTINE GYNECOLOGICAL EXAM: ICD-10-CM

## 2021-02-03 DIAGNOSIS — Z00.00 PHYSICAL EXAM: ICD-10-CM

## 2021-02-03 DIAGNOSIS — Z00.00 PHYSICAL EXAM: Primary | ICD-10-CM

## 2021-02-03 PROCEDURE — 99395 PREV VISIT EST AGE 18-39: CPT | Performed by: INTERNAL MEDICINE

## 2021-02-03 RX ORDER — NORGESTIMATE AND ETHINYL ESTRADIOL 7DAYSX3 LO
KIT ORAL
Qty: 28 TAB | Refills: 0 | Status: SHIPPED | OUTPATIENT
Start: 2021-02-03 | End: 2021-02-25

## 2021-02-03 NOTE — PROGRESS NOTES
Written by Pratibha Nicole, as dictated by Dr. Kasi Garcia MD.    Guerda Avery (: 1985) is a 37 y.o. female, established patient, here for evaluation of the following chief complaint(s):  Physical (lab) and Gyn Exam       SUBJECTIVE/OBJECTIVE:  HPI  The patient comes in today for a complete physical examination and pap smear. She has had 2 yeast infections in the past year and treated them with OTC remedies, so she would like to know if her vagina is seeming healthy today. Her menstrual cycle has been regular and she continues on Sprintec. She has lost weight from 188 lb on 19 to 177 lb today and notes that she has been spinning a lot on her new [employer]. She has still been getting some boils in her armpits and wonders if she has hidradenitis suppurativa. She has been using a neosporin-like OTC treatment she read about in a blog for hidradenitis, and it has been helping. 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 She got her flu vaccine in 2020. Patient Active Problem List   Diagnosis Code   (none) - all problems resolved or deleted        Current Outpatient Medications on File Prior to Visit   Medication Sig Dispense Refill    Tri-Lo-Sprintec 0.18/0.215/0.25 mg-25 mcg tab TAKE 1 TABLET BY MOUTH DAILY 28 Tab 0    aspirin/acetaminophen/caffeine (EXCEDRIN MIGRAINE PO) Take  by mouth.  MULTIVITAMIN PO Take  by mouth.  lidocaine (LIDOCAINE VISCOUS) 2 % solution Take 5 mL by mouth every three (3) hours as needed for Pain. With a sip of water, gargle for 30-60 seconds then spit 100 mL 0    benzonatate (TESSALON) 200 mg capsule Take 1 Cap by mouth three (3) times daily as needed for Cough. 30 Cap 0    carbamide peroxide (DEBROX) 6.5 % otic solution Administer 5 Drops into each ear two (2) times a day.  7.5 mL 0    NORGESTIMATE-ETHINYL ESTRADIOL (TRINESSA LO PO) Take 1 Tab by mouth daily.  cholecalciferol, vitamin D3, (VITAMIN D3) 2,000 unit Tab Take 1 Tab by mouth daily. No current facility-administered medications on file prior to visit. Allergies   Allergen Reactions    Sulfa (Sulfonamide Antibiotics) Hives    Coconut Hives       Past Medical History:   Diagnosis Date    Adverse effect of anesthesia     slow to awake. slow to function    Asthma 10/20/11    CHILDHOOD; NO INHALER IN YEARS    Right groin pain 7/24/2017    Skin lesion of right lower extremity 8/7/2017       Past Surgical History:   Procedure Laterality Date    HX CYST REMOVAL  11/01/2010    inner right thigh    HX CYST REMOVAL  10/2011    HX CYST REMOVAL  8/16/13    Excision recurrent cyst, right groin    HX CYST REMOVAL  08/2017    inner right thigh    HX OTHER SURGICAL  11/10    EXCISION R GROIN CYST X2    HX OTHER SURGICAL  07/2017    wisdom teeth extraction    HX OTHER SURGICAL Right 08/23/2017    excision skin lesion right inner thigh by Dr. Rodo Sanchez.     HX WISDOM TEETH EXTRACTION  07/11/2017       Family History   Problem Relation Age of Onset    Asthma Mother     Hypertension Father     Diabetes Maternal Grandfather     Diabetes Paternal Grandmother     Cancer Paternal Grandfather         BRAIN TUMOR    Cancer Other         FGR GRANDFATHER-LUNG CA    Heart Disease Neg Hx     Stroke Neg Hx     Malignant Hyperthermia Neg Hx     Pseudocholinesterase Deficiency Neg Hx     Delayed Awakening Neg Hx     Post-op Nausea/Vomiting Neg Hx        Social History     Socioeconomic History    Marital status:      Spouse name: Not on file    Number of children: Not on file    Years of education: Not on file    Highest education level: Not on file   Occupational History    Not on file   Social Needs    Financial resource strain: Not on file    Food insecurity     Worry: Not on file     Inability: Not on file    Transportation needs     Medical: Not on file Non-medical: Not on file   Tobacco Use    Smoking status: Former Smoker     Packs/day: 0.25     Years: 0.50     Pack years: 0.12     Quit date: 2011     Years since quittin.4    Smokeless tobacco: Never Used   Substance and Sexual Activity    Alcohol use: Yes     Comment: one time a week    Drug use: No    Sexual activity: Yes     Partners: Male     Birth control/protection: Pill   Lifestyle    Physical activity     Days per week: Not on file     Minutes per session: Not on file    Stress: Not on file   Relationships    Social connections     Talks on phone: Not on file     Gets together: Not on file     Attends Spiritism service: Not on file     Active member of club or organization: Not on file     Attends meetings of clubs or organizations: Not on file     Relationship status: Not on file    Intimate partner violence     Fear of current or ex partner: Not on file     Emotionally abused: Not on file     Physically abused: Not on file     Forced sexual activity: Not on file   Other Topics Concern    Not on file   Social History Narrative    Not on file       No visits with results within 3 Month(s) from this visit. Latest known visit with results is:   Office Visit on 2019   Component Date Value Ref Range Status    VALID INTERNAL CONTROL POC 2019 Yes   Final    Group A Strep Ag 2019 Negative  Negative Final    VALID INTERNAL CONTROL POC 2019 Yes   Final    Influenza A Ag POC 2019 Negative  Negative Pos/Neg Final    Influenza B Ag POC 2019 Negative  Negative Pos/Neg Final    Upper Respiratory Culture 2019    Final                    Value:Routine chris  Moderate growth       Review of Systems   Constitutional: Negative for activity change, fatigue and unexpected weight change. HENT: Negative for congestion, hearing loss, rhinorrhea and sore throat. Eyes: Negative for discharge.    Respiratory: Negative for cough, chest tightness and shortness of breath. Cardiovascular: Negative for leg swelling. Gastrointestinal: Negative for abdominal pain, constipation and diarrhea. Genitourinary: Positive for menstrual problem (menorrhagia). Negative for dysuria, flank pain, frequency and urgency. +recurrent yeast infections   Musculoskeletal: Negative for arthralgias, back pain and myalgias. Skin: Negative for color change and rash.        +boils in B/L axillae, intermittent   Neurological: Negative for dizziness, light-headedness and headaches. Psychiatric/Behavioral: Negative for dysphoric mood and sleep disturbance. The patient is not nervous/anxious. Visit Vitals  /80 (BP 1 Location: Left upper arm, BP Patient Position: Sitting)   Pulse 67   Temp 97.8 °F (36.6 °C) (Temporal)   Resp 18   Ht 5' 5\" (1.651 m)   Wt 177 lb 12.8 oz (80.6 kg)   LMP 01/13/2021   SpO2 96%   BMI 29.59 kg/m²     Physical Exam  Vitals signs and nursing note reviewed. Constitutional:       General: She is not in acute distress. Appearance: Normal appearance. She is overweight. She is not diaphoretic. HENT:      Right Ear: Tympanic membrane, ear canal and external ear normal.      Left Ear: Tympanic membrane, ear canal and external ear normal.      Mouth/Throat:      Mouth: Mucous membranes are moist.      Pharynx: Oropharynx is clear. Eyes:      General:         Right eye: No discharge. Left eye: No discharge. Extraocular Movements: Extraocular movements intact. Conjunctiva/sclera: Conjunctivae normal.   Neck:      Thyroid: No thyromegaly. Cardiovascular:      Rate and Rhythm: Normal rate and regular rhythm. Pulses: Normal pulses. Dorsalis pedis pulses are 2+ on the right side and 2+ on the left side. Pulmonary:      Effort: Pulmonary effort is normal.      Breath sounds: Normal breath sounds. No wheezing. Chest:      Comments: No masses palpable, no nipple discharge, no axillary lymphadenopathy.    Abdominal: General: Bowel sounds are normal. There is no distension. Palpations: Abdomen is soft. Tenderness: There is no abdominal tenderness. Genitourinary:     Comments: normal external genitalia, vulva, vagina, and cervix  Musculoskeletal:      Right lower leg: No edema. Left lower leg: No edema. Comments: B/L knees without crepitus   Lymphadenopathy:      Cervical: No cervical adenopathy. Neurological:      Deep Tendon Reflexes:      Reflex Scores:       Patellar reflexes are 2+ on the right side and 2+ on the left side. Psychiatric:         Mood and Affect: Mood and affect normal.         ASSESSMENT/PLAN:  1. Physical exam  -     METABOLIC PANEL, COMPREHENSIVE; Future  -     CBC W/O DIFF; Future  -     LIPID PANEL; Future  -     TSH 3RD GENERATION; Future  Complete physical exam done. Pt will return during lab hours to have basic fasting labs drawn. 2. Well female exam with routine gynecological exam  -     PAP IG, APTIMA HPV AND RFX 16/18,45 ([id number]); Future  -     PAP IG, APTIMA HPV AND RFX 16/18,45 ([id number])  Pap smear done in office today. Pt tolerated the procedure well. 3. Menorrhagia with regular cycle  Continues on Sprintec. 4. Vitamin D deficiency  -     VITAMIN D, 25 HYDROXY; Future  Check vitamin D. Additional comments: We discussed that she does not completely fit the category for hidradenitis suppurativa but I recommended she try OTC Hibiclens to see if it help. This plan was reviewed with the patient and patient agrees. All questions were answered. This scribe documentation was reviewed by me and accurately reflects the examination and decisions made by me. An electronic signature was used to authenticate this note.   -- Monique Dance

## 2021-02-06 LAB
25(OH)D3+25(OH)D2 SERPL-MCNC: 33.2 NG/ML (ref 30–100)
ALBUMIN SERPL-MCNC: 4.4 G/DL (ref 3.8–4.8)
ALBUMIN/GLOB SERPL: 1.8 {RATIO} (ref 1.2–2.2)
ALP SERPL-CCNC: 65 IU/L (ref 39–117)
ALT SERPL-CCNC: 16 IU/L (ref 0–32)
AST SERPL-CCNC: 17 IU/L (ref 0–40)
BILIRUB SERPL-MCNC: 0.3 MG/DL (ref 0–1.2)
BUN SERPL-MCNC: 13 MG/DL (ref 6–20)
BUN/CREAT SERPL: 16 (ref 9–23)
CALCIUM SERPL-MCNC: 9.1 MG/DL (ref 8.7–10.2)
CHLORIDE SERPL-SCNC: 102 MMOL/L (ref 96–106)
CHOLEST SERPL-MCNC: 226 MG/DL (ref 100–199)
CO2 SERPL-SCNC: 23 MMOL/L (ref 20–29)
CREAT SERPL-MCNC: 0.79 MG/DL (ref 0.57–1)
ERYTHROCYTE [DISTWIDTH] IN BLOOD BY AUTOMATED COUNT: 12.3 % (ref 11.7–15.4)
GLOBULIN SER CALC-MCNC: 2.4 G/DL (ref 1.5–4.5)
GLUCOSE SERPL-MCNC: 82 MG/DL (ref 65–99)
HCT VFR BLD AUTO: 38.3 % (ref 34–46.6)
HDLC SERPL-MCNC: 76 MG/DL
HGB BLD-MCNC: 13.2 G/DL (ref 11.1–15.9)
LDLC SERPL CALC-MCNC: 125 MG/DL (ref 0–99)
MCH RBC QN AUTO: 30.1 PG (ref 26.6–33)
MCHC RBC AUTO-ENTMCNC: 34.5 G/DL (ref 31.5–35.7)
MCV RBC AUTO: 87 FL (ref 79–97)
PLATELET # BLD AUTO: 241 X10E3/UL (ref 150–450)
POTASSIUM SERPL-SCNC: 4.2 MMOL/L (ref 3.5–5.2)
PROT SERPL-MCNC: 6.8 G/DL (ref 6–8.5)
RBC # BLD AUTO: 4.38 X10E6/UL (ref 3.77–5.28)
SODIUM SERPL-SCNC: 138 MMOL/L (ref 134–144)
TRIGL SERPL-MCNC: 142 MG/DL (ref 0–149)
TSH SERPL DL<=0.005 MIU/L-ACNC: 0.86 UIU/ML (ref 0.45–4.5)
VLDLC SERPL CALC-MCNC: 25 MG/DL (ref 5–40)
WBC # BLD AUTO: 6.1 X10E3/UL (ref 3.4–10.8)

## 2021-02-07 NOTE — PROGRESS NOTES
Alireza Landry, it was nice seeing you at your recent visit. Your blood report is back and overall numbers look fine. Cholesterol numbers have slightly improved but still on the high side. I would recommend continuing low carb diet & exercise. Vitamin D came back in normal range. Still waiting for pap results.

## 2021-02-08 LAB
CYTOLOGIST CVX/VAG CYTO: NORMAL
CYTOLOGY CVX/VAG DOC CYTO: NORMAL
CYTOLOGY CVX/VAG DOC THIN PREP: NORMAL
DX ICD CODE: NORMAL
HPV I/H RISK 4 DNA CVX QL PROBE+SIG AMP: NEGATIVE
Lab: NORMAL
OTHER STN SPEC: NORMAL
STAT OF ADQ CVX/VAG CYTO-IMP: NORMAL

## 2021-02-09 NOTE — PROGRESS NOTES
Dearl Julien, good news your Pap smear came back normal.  I hope you had  read my comments under your lab report.

## 2021-02-25 DIAGNOSIS — N92.0 MENORRHAGIA WITH REGULAR CYCLE: ICD-10-CM

## 2021-02-25 RX ORDER — NORGESTIMATE AND ETHINYL ESTRADIOL 7DAYSX3 LO
KIT ORAL
Qty: 28 TAB | Refills: 0 | Status: SHIPPED | OUTPATIENT
Start: 2021-02-25 | End: 2021-03-21

## 2021-03-21 DIAGNOSIS — N92.0 MENORRHAGIA WITH REGULAR CYCLE: ICD-10-CM

## 2021-03-21 RX ORDER — NORGESTIMATE AND ETHINYL ESTRADIOL 7DAYSX3 LO
KIT ORAL
Qty: 28 TAB | Refills: 0 | Status: SHIPPED | OUTPATIENT
Start: 2021-03-21 | End: 2021-04-12

## 2021-04-12 DIAGNOSIS — N92.0 MENORRHAGIA WITH REGULAR CYCLE: ICD-10-CM

## 2021-04-12 RX ORDER — NORGESTIMATE AND ETHINYL ESTRADIOL 7DAYSX3 LO
KIT ORAL
Qty: 28 TAB | Refills: 0 | Status: SHIPPED | OUTPATIENT
Start: 2021-04-12 | End: 2021-05-08

## 2021-05-08 DIAGNOSIS — N92.0 MENORRHAGIA WITH REGULAR CYCLE: ICD-10-CM

## 2021-05-08 RX ORDER — NORGESTIMATE AND ETHINYL ESTRADIOL 7DAYSX3 LO
KIT ORAL
Qty: 28 TAB | Refills: 0 | Status: SHIPPED | OUTPATIENT
Start: 2021-05-08 | End: 2021-06-12

## 2021-06-12 DIAGNOSIS — N92.0 MENORRHAGIA WITH REGULAR CYCLE: ICD-10-CM

## 2021-06-12 RX ORDER — NORGESTIMATE AND ETHINYL ESTRADIOL 7DAYSX3 LO
KIT ORAL
Qty: 28 TABLET | Refills: 0 | Status: SHIPPED | OUTPATIENT
Start: 2021-06-12 | End: 2021-07-18

## 2021-06-25 ENCOUNTER — VIRTUAL VISIT (OUTPATIENT)
Dept: PRIMARY CARE CLINIC | Age: 36
End: 2021-06-25
Payer: COMMERCIAL

## 2021-06-25 DIAGNOSIS — N90.7 LABIAL CYST: ICD-10-CM

## 2021-06-25 DIAGNOSIS — R10.31 RIGHT INGUINAL PAIN: ICD-10-CM

## 2021-06-25 DIAGNOSIS — L72.9 INFECTED CYST OF SKIN: Primary | ICD-10-CM

## 2021-06-25 DIAGNOSIS — L08.9 INFECTED CYST OF SKIN: Primary | ICD-10-CM

## 2021-06-25 PROCEDURE — 99213 OFFICE O/P EST LOW 20 MIN: CPT | Performed by: INTERNAL MEDICINE

## 2021-06-25 RX ORDER — DOXYCYCLINE 100 MG/1
100 CAPSULE ORAL 2 TIMES DAILY
Qty: 20 CAPSULE | Refills: 0 | Status: SHIPPED | OUTPATIENT
Start: 2021-06-25 | End: 2021-07-05

## 2021-06-25 NOTE — PROGRESS NOTES
Panchito Montes (: 1985) is a 37 y.o. female, established patient, here for evaluation of the following chief complaint(s):   Cyst   Written by Sophie Sprague, as dictated by Dr. Vj Mendez MD.      ASSESSMENT/PLAN:  Below is the assessment and plan developed based on review of pertinent labs, studies, and medications. 1. Infected cyst of skin  Prescribed doxycycline 100 mg. Take medication as prescribed. Potential side effects were discussed. -     doxycycline (VIBRAMYCIN) 100 mg capsule; Take 1 Capsule by mouth two (2) times a day for 10 days. , Normal, Disp-20 Capsule, R-0 sent to pharmacy. If no improvement or it comes back she should see a surgeon. 2. Labial cyst  Prescribed doxycycline 100 mg. Take medication as prescribed. Potential side effects were discussed. -     doxycycline (VIBRAMYCIN) 100 mg capsule; Take 1 Capsule by mouth two (2) times a day for 10 days. , Normal, Disp-20 Capsule, R-0 sent to pharmacy. 3. Right inguinal pain  Secondary to labial cyst.      SUBJECTIVE/OBJECTIVE:  HPI     Patient presents today c/o pain from cysts. She has 3 cysts on her buttocks and labial area that prevent her from walking and sitting. She had cysts in her groin removed in the past, but the cysts have reoccured. She also has high blood pressure  from pain sxs. The pain has worsened during the past 3 days. She denies any oozing. She continues to have cysts in her armpits, but they are manageable. Patient Active Problem List   Diagnosis Code   (none) - all problems resolved or deleted        Current Outpatient Medications on File Prior to Visit   Medication Sig Dispense Refill    Tri-Lo-Sprintec 0.18/0.215/0.25 mg-25 mcg tab TAKE 1 TABLET BY MOUTH DAILY 28 Tablet 0    lidocaine (LIDOCAINE VISCOUS) 2 % solution Take 5 mL by mouth every three (3) hours as needed for Pain.  With a sip of water, gargle for 30-60 seconds then spit 100 mL 0    benzonatate (TESSALON) 200 mg capsule Take 1 Cap by mouth three (3) times daily as needed for Cough. 30 Cap 0    carbamide peroxide (DEBROX) 6.5 % otic solution Administer 5 Drops into each ear two (2) times a day. 7.5 mL 0    aspirin/acetaminophen/caffeine (EXCEDRIN MIGRAINE PO) Take  by mouth.  NORGESTIMATE-ETHINYL ESTRADIOL (TRINESSA LO PO) Take 1 Tab by mouth daily.  cholecalciferol, vitamin D3, (VITAMIN D3) 2,000 unit Tab Take 1 Tab by mouth daily.  MULTIVITAMIN PO Take  by mouth. No current facility-administered medications on file prior to visit. Allergies   Allergen Reactions    Sulfa (Sulfonamide Antibiotics) Hives    Coconut Hives       Past Medical History:   Diagnosis Date    Adverse effect of anesthesia     slow to awake. slow to function    Asthma 10/20/11    CHILDHOOD; NO INHALER IN YEARS    Right groin pain 7/24/2017    Skin lesion of right lower extremity 8/7/2017       Past Surgical History:   Procedure Laterality Date    HX CYST REMOVAL  11/01/2010    inner right thigh    HX CYST REMOVAL  10/2011    HX CYST REMOVAL  8/16/13    Excision recurrent cyst, right groin    HX CYST REMOVAL  08/2017    inner right thigh    HX OTHER SURGICAL  11/10    EXCISION R GROIN CYST X2    HX OTHER SURGICAL  07/2017    wisdom teeth extraction    HX OTHER SURGICAL Right 08/23/2017    excision skin lesion right inner thigh by Dr. Nellie Choudhury.     HX WISDOM TEETH EXTRACTION  07/11/2017       Family History   Problem Relation Age of Onset    Asthma Mother     Hypertension Father     Diabetes Maternal Grandfather     Diabetes Paternal Grandmother     Cancer Paternal Grandfather         BRAIN TUMOR    Cancer Other         FGR GRANDFATHER-LUNG CA    Heart Disease Neg Hx     Stroke Neg Hx     Malignant Hyperthermia Neg Hx     Pseudocholinesterase Deficiency Neg Hx     Delayed Awakening Neg Hx     Post-op Nausea/Vomiting Neg Hx        Social History     Socioeconomic History    Marital status:      Spouse name: Not on file    Number of children: Not on file    Years of education: Not on file    Highest education level: Not on file   Occupational History    Not on file   Tobacco Use    Smoking status: Former Smoker     Packs/day: 0.25     Years: 0.50     Pack years: 0.12     Quit date: 2011     Years since quittin.8    Smokeless tobacco: Never Used   Substance and Sexual Activity    Alcohol use: Yes     Comment: one time a week    Drug use: No    Sexual activity: Yes     Partners: Male     Birth control/protection: Pill   Other Topics Concern    Not on file   Social History Narrative    Not on file     Social Determinants of Health     Financial Resource Strain:     Difficulty of Paying Living Expenses:    Food Insecurity:     Worried About Running Out of Food in the Last Year:     [address] in the Last Year:    Transportation Needs:     Lack of Transportation (Medical):  Lack of Transportation (Non-Medical):    Physical Activity:     Days of Exercise per Week:     Minutes of Exercise per Session:    Stress:     Feeling of Stress :    Social Connections:     Frequency of Communication with Friends and Family:     Frequency of Social Gatherings with Friends and Family:     Attends Synagogue Services:     Active Member of Clubs or Organizations:     Attends Club or Organization Meetings:     Marital Status:    Intimate Partner Violence:     Fear of Current or Ex-Partner:     Emotionally Abused:     Physically Abused:     Sexually Abused:        No visits with results within 3 Month(s) from this visit.    Latest known visit with results is:   Orders Only on 2021   Component Date Value Ref Range Status    Glucose 2021 82  65 - 99 mg/dL Final    BUN 2021 13  6 - 20 mg/dL Final    Creatinine 2021 0.79  0.57 - 1.00 mg/dL Final    GFR est non-AA 2021 99  >59 mL/min/1.73 Final    GFR est AA 2021 114  >59 mL/min/1.73 Final    BUN/Creatinine ratio 02/05/2021 16  9 - 23 Final    Sodium 02/05/2021 138  134 - 144 mmol/L Final    Potassium 02/05/2021 4.2  3.5 - 5.2 mmol/L Final    Chloride 02/05/2021 102  96 - 106 mmol/L Final    CO2 02/05/2021 23  20 - 29 mmol/L Final    Calcium 02/05/2021 9.1  8.7 - 10.2 mg/dL Final    Protein, total 02/05/2021 6.8  6.0 - 8.5 g/dL Final    Albumin 02/05/2021 4.4  3.8 - 4.8 g/dL Final    GLOBULIN, TOTAL 02/05/2021 2.4  1.5 - 4.5 g/dL Final    A-G Ratio 02/05/2021 1.8  1.2 - 2.2 Final    Bilirubin, total 02/05/2021 0.3  0.0 - 1.2 mg/dL Final    Alk. phosphatase 02/05/2021 65  39 - 117 IU/L Final    AST (SGOT) 02/05/2021 17  0 - 40 IU/L Final    ALT (SGPT) 02/05/2021 16  0 - 32 IU/L Final    WBC 02/05/2021 6.1  3.4 - 10.8 x10E3/uL Final    RBC 02/05/2021 4.38  3.77 - 5.28 x10E6/uL Final    HGB 02/05/2021 13.2  11.1 - 15.9 g/dL Final    HCT 02/05/2021 38.3  34.0 - 46.6 % Final    MCV 02/05/2021 87  79 - 97 fL Final    MCH 02/05/2021 30.1  26.6 - 33.0 pg Final    MCHC 02/05/2021 34.5  31.5 - 35.7 g/dL Final    RDW 02/05/2021 12.3  11.7 - 15.4 % Final    PLATELET 51/95/7200 331  150 - 450 x10E3/uL Final    Cholesterol, total 02/05/2021 226* 100 - 199 mg/dL Final    Triglyceride 02/05/2021 142  0 - 149 mg/dL Final    HDL Cholesterol 02/05/2021 76  >39 mg/dL Final    VLDL, calculated 02/05/2021 25  5 - 40 mg/dL Final    LDL, calculated 02/05/2021 125* 0 - 99 mg/dL Final    TSH 02/05/2021 0.856  0.450 - 4.500 uIU/mL Final    VITAMIN D, 25-HYDROXY 02/05/2021 33.2  30.0 - 100.0 ng/mL Final    Comment: Vitamin D deficiency has been defined by the [place] of  Medicine and an Endocrine Society practice guideline as a  level of serum 25-OH vitamin D less than 20 ng/mL (1,2). The Endocrine Society went on to further define vitamin D  insufficiency as a level between 21 and 29 ng/mL (2). 1. IOM ([place] of Medicine). 2010. Dietary reference     intakes for calcium and D. [facility]:  The     [facility] [employer]. 2. Mignon MF, Remedios NC, [place] RAE, et al.     Evaluation, treatment, and prevention of vitamin D     deficiency: an Endocrine Society clinical practice     guideline. JCEM. 2011 Jul; 96(7):1911-30. Review of Systems   Constitutional: Negative for activity change, fatigue and unexpected weight change. HENT: Negative for congestion, hearing loss, rhinorrhea and sore throat. Eyes: Negative for discharge. Respiratory: Negative for cough, chest tightness and shortness of breath. Cardiovascular: Negative for leg swelling. Gastrointestinal: Negative for abdominal pain, constipation and diarrhea. Genitourinary: Negative for dysuria, flank pain, frequency and urgency. Musculoskeletal: Negative for arthralgias, back pain and myalgias. Skin: Negative for color change and rash. Labial cysts   Neurological: Negative for dizziness, light-headedness and headaches. Psychiatric/Behavioral: Negative for dysphoric mood and sleep disturbance. The patient is not nervous/anxious.          Patient-Reported Vitals 6/25/2021   Patient-Reported Weight 175   Patient-Reported Height 55   Patient-Reported Pulse 81   Patient-Reported Temperature 98.8   Patient-Reported SpO2 97   Patient-Reported Systolic  356   Patient-Reported Diastolic 98   Patient-Reported LMP June 9th       Physical Exam    [INSTRUCTIONS:  \"[x]\" Indicates a positive item  \"[]\" Indicates a negative item  -- DELETE ALL ITEMS NOT EXAMINED]    Constitutional: [x] Appears well-developed and well-nourished [x] No apparent distress      [] Abnormal -     Mental status: [x] Alert and awake  [x] Oriented to person/place/time [x] Able to follow commands    [] Abnormal -     Eyes:   EOM    [x]  Normal    [] Abnormal -   Sclera  [x]  Normal    [] Abnormal -          Discharge [x]  None visible   [] Abnormal -     HENT: [x] Normocephalic, atraumatic  [] Abnormal -   [x] Mouth/Throat: Mucous membranes are moist    External Ears [x] Normal  [] Abnormal -    Neck: [x] No visualized mass [] Abnormal -     Pulmonary/Chest: [x] Respiratory effort normal   [x] No visualized signs of difficulty breathing or respiratory distress        [] Abnormal -      Musculoskeletal:   [x] Normal gait with no signs of ataxia         [x] Normal range of motion of neck        [] Abnormal -     Neurological:        [x] No Facial Asymmetry (Cranial nerve 7 motor function) (limited exam due to video visit)          [x] No gaze palsy        [] Abnormal -          Skin:        [x] No significant exanthematous lesions or discoloration noted on facial skin         [] Abnormal -            Psychiatric:       [x] Normal Affect [] Abnormal -        [x] No Hallucinations    Other pertinent observable physical exam findings:-          Catie Ramirez, was evaluated through a synchronous (real-time) audio-video encounter. The patient (or guardian if applicable) is aware that this is a billable service. Verbal consent to proceed has been obtained within the past 12 months. The visit was conducted pursuant to the emergency declaration under the [address] [place] [address] authority and the [employer] Act. Patient identification was verified, and a caregiver was present when appropriate. The patient was located in a state where the provider was credentialed to provide care. An electronic signature was used to authenticate this note.   -- Clary Cr

## 2021-07-18 DIAGNOSIS — N92.0 MENORRHAGIA WITH REGULAR CYCLE: ICD-10-CM

## 2021-07-18 RX ORDER — NORGESTIMATE AND ETHINYL ESTRADIOL 7DAYSX3 LO
KIT ORAL
Qty: 28 TABLET | Refills: 0 | Status: SHIPPED | OUTPATIENT
Start: 2021-07-18 | End: 2021-08-12

## 2021-08-12 DIAGNOSIS — N92.0 MENORRHAGIA WITH REGULAR CYCLE: ICD-10-CM

## 2021-08-12 RX ORDER — NORGESTIMATE AND ETHINYL ESTRADIOL 7DAYSX3 LO
KIT ORAL
Qty: 28 TABLET | Refills: 0 | Status: SHIPPED | OUTPATIENT
Start: 2021-08-12 | End: 2021-09-06

## 2021-09-09 ENCOUNTER — OFFICE VISIT (OUTPATIENT)
Dept: PRIMARY CARE CLINIC | Age: 36
End: 2021-09-09
Payer: COMMERCIAL

## 2021-09-09 VITALS
TEMPERATURE: 98 F | SYSTOLIC BLOOD PRESSURE: 129 MMHG | HEART RATE: 72 BPM | OXYGEN SATURATION: 99 % | RESPIRATION RATE: 16 BRPM | WEIGHT: 181 LBS | DIASTOLIC BLOOD PRESSURE: 86 MMHG | BODY MASS INDEX: 30.16 KG/M2 | HEIGHT: 65 IN

## 2021-09-09 DIAGNOSIS — N89.8 DISCHARGE FROM THE VAGINA: Primary | ICD-10-CM

## 2021-09-09 PROCEDURE — 99213 OFFICE O/P EST LOW 20 MIN: CPT | Performed by: FAMILY MEDICINE

## 2021-09-09 RX ORDER — DOXYCYCLINE 100 MG/1
100 CAPSULE ORAL 2 TIMES DAILY
COMMUNITY
Start: 2021-08-20

## 2021-09-09 NOTE — PROGRESS NOTES
Identified pt with two pt identifiers(name and ). Chief Complaint   Patient presents with    Vaginal Discharge     black has been going on for 9 + days         3 most recent PHQ Screens 2021   Little interest or pleasure in doing things Not at all   Feeling down, depressed, irritable, or hopeless Not at all   Total Score PHQ 2 0        Vitals:    21 1349   BP: 129/86   Pulse: 72   Resp: 16   Temp: 98 °F (36.7 °C)   TempSrc: Temporal   SpO2: 99%   Weight: 181 lb (82.1 kg)   Height: 5' 5\" (1.651 m)   PainSc:   0 - No pain   LMP: 2021       Health Maintenance Due   Topic    Hepatitis C Screening     Flu Vaccine (1)       1. Have you been to the ER, urgent care clinic since your last visit? Hospitalized since your last visit? Urgent care - thumb pain     2. Have you seen or consulted any other health care providers outside of the [address] [place] [address] since your last visit? Include any pap smears or colon screening.  dermatology

## 2021-09-09 NOTE — PROGRESS NOTES
Subjective:     Chief Complaint   Patient presents with    Vaginal Discharge     black has been going on for 9 + days         She  is a 37y.o. year old female with hx of menorrhagia with regular cycle who presents for evaluation of dark brown vaginal discharge started about 9 days ago. She reports that sometimes discharge looks like dark long streak or sometimes mucus looking in last 9 days. Noticed only when she wiped. Last period was a month ago. She reports that in her last cycle she used menstrual disc for the first time. She did not have any fever, pain, or foul vaginal discharge smell any other symptoms. Not sexually active. She is otherwise healthy. She started her period today which is regular looking she states. Pertinent items are noted in HPI. Objective:     Vitals:    09/09/21 1349   BP: 129/86   Pulse: 72   Resp: 16   Temp: 98 °F (36.7 °C)   TempSrc: Temporal   SpO2: 99%   Weight: 181 lb (82.1 kg)   Height: 5' 5\" (1.651 m)       Physical Examination: General appearance - alert, well appearing, and in no distress, oriented to person, place, and time and overweight  Mental status - alert, oriented to person, place, and time, normal mood, behavior, speech, dress, motor activity, and thought processes  Chest - clear to auscultation, no wheezes, rales or rhonchi, symmetric air entry  Heart - normal rate, regular rhythm, normal S1, S2, no murmurs, rubs, clicks or gallops  Abdomen - soft, nontender, nondistended, no masses or organomegaly. Pelvic exam: Deferred . She is on her period.      Allergies   Allergen Reactions    Sulfa (Sulfonamide Antibiotics) Hives    Coconut Hives      Social History     Socioeconomic History    Marital status:      Spouse name: Not on file    Number of children: Not on file    Years of education: Not on file    Highest education level: Not on file   Tobacco Use    Smoking status: Former Smoker     Packs/day: 0.25     Years: 0.50     Pack years: 0.12 Quit date: 8/11/2011     Years since quitting: 10.0    Smokeless tobacco: Never Used   Vaping Use    Vaping Use: Never used   Substance and Sexual Activity    Alcohol use: Yes     Comment: one time a week    Drug use: No    Sexual activity: Yes     Partners: Male     Birth control/protection: Pill     Social Determinants of Health     Financial Resource Strain:     Difficulty of Paying Living Expenses:    Food Insecurity:     Worried About Running Out of Food in the Last Year:     Ran Out of Food in the Last Year:    Transportation Needs:     Lack of Transportation (Medical):      Lack of Transportation (Non-Medical):    Physical Activity:     Days of Exercise per Week:     Minutes of Exercise per Session:    Stress:     Feeling of Stress :    Social Connections:     Frequency of Communication with Friends and Family:     Frequency of Social Gatherings with Friends and Family:     Attends Rastafarian Services:     Active Member of Clubs or Organizations:     Attends Club or Organization Meetings:     Marital Status:       Family History   Problem Relation Age of Onset    Asthma Mother     Hypertension Father     Diabetes Maternal Grandfather     Diabetes Paternal Grandmother     Cancer Paternal Grandfather         BRAIN TUMOR    Cancer Other         FGR GRANDFATHER-LUNG CA    Heart Disease Neg Hx     Stroke Neg Hx     Malignant Hyperthermia Neg Hx     Pseudocholinesterase Deficiency Neg Hx     Delayed Awakening Neg Hx     Post-op Nausea/Vomiting Neg Hx       Past Surgical History:   Procedure Laterality Date    HX CYST REMOVAL  11/01/2010    inner right thigh    HX CYST REMOVAL  10/2011    HX CYST REMOVAL  8/16/13    Excision recurrent cyst, right groin    HX CYST REMOVAL  08/2017    inner right thigh    HX OTHER SURGICAL  11/10    EXCISION R GROIN CYST X2    HX OTHER SURGICAL  07/2017    wisdom teeth extraction    HX OTHER SURGICAL Right 08/23/2017    excision skin lesion right inner thigh by Dr. Maite Amaro.  HX WISDOM TEETH EXTRACTION  07/11/2017      Past Medical History:   Diagnosis Date    Adverse effect of anesthesia     slow to awake. slow to function    Asthma 10/20/11    CHILDHOOD; NO INHALER IN YEARS    Right groin pain 7/24/2017    Skin lesion of right lower extremity 8/7/2017      Current Outpatient Medications   Medication Sig Dispense Refill    Tri-Lo-Sprintec 0.18/0.215/0.25 mg-25 mcg tab TAKE 1 TABLET BY MOUTH DAILY 28 Tablet 1    aspirin/acetaminophen/caffeine (EXCEDRIN MIGRAINE PO) Take  by mouth.  cholecalciferol, vitamin D3, (VITAMIN D3) 2,000 unit Tab Take 1 Tab by mouth daily.  MULTIVITAMIN PO Take  by mouth.  doxycycline (VIBRAMYCIN) 100 mg capsule Take 100 mg by mouth two (2) times a day.  lidocaine (LIDOCAINE VISCOUS) 2 % solution Take 5 mL by mouth every three (3) hours as needed for Pain. With a sip of water, gargle for 30-60 seconds then spit 100 mL 0    benzonatate (TESSALON) 200 mg capsule Take 1 Cap by mouth three (3) times daily as needed for Cough. 30 Cap 0    carbamide peroxide (DEBROX) 6.5 % otic solution Administer 5 Drops into each ear two (2) times a day. 7.5 mL 0    NORGESTIMATE-ETHINYL ESTRADIOL (TRINESSA LO PO) Take 1 Tab by mouth daily. (Patient not taking: Reported on 9/9/2021)          Assessment/ Plan:   Diagnoses and all orders for this visit:    1. Discharge from the vagina      Dark vaginal noted for the past 9 days. She was asymptomatic otherwise. She started having normal regular period. Pelvic exam deferred. D/D and reassurance provided. Advised to monitor her symptoms. Follow up with Dr. Abran Hutton if symptoms persists. Medication risks/benefits/costs/interactions/alternatives discussed with patient.   Advised patient to call back or return to office if symptoms worsen/change/persist. If patient cannot reach us or should anything more severe/urgent arise he/she should proceed directly to the nearest emergency department. Discussed expected course/resolution/complications of diagnosis in detail with patient. Patient given a written after visit summary which includes her diagnoses, current medications and vitals. Patient expressed understanding with the diagnosis and plan. Follow-up and Dispositions    · Return if symptoms worsen or fail to improve, for with PCP.

## 2021-10-16 DIAGNOSIS — N92.0 MENORRHAGIA WITH REGULAR CYCLE: ICD-10-CM

## 2021-10-16 RX ORDER — NORGESTIMATE AND ETHINYL ESTRADIOL 7DAYSX3 LO
KIT ORAL
Qty: 28 TABLET | Refills: 1 | Status: SHIPPED | OUTPATIENT
Start: 2021-10-16 | End: 2021-12-10

## 2021-12-10 DIAGNOSIS — N92.0 MENORRHAGIA WITH REGULAR CYCLE: ICD-10-CM

## 2021-12-10 RX ORDER — NORGESTIMATE AND ETHINYL ESTRADIOL 7DAYSX3 LO
KIT ORAL
Qty: 28 TABLET | Refills: 1 | Status: SHIPPED | OUTPATIENT
Start: 2021-12-10 | End: 2022-01-28

## 2022-01-19 DIAGNOSIS — M79.601 PAIN IN INFERIOR RIGHT UPPER EXTREMITY: Primary | ICD-10-CM

## 2022-01-27 DIAGNOSIS — N92.0 MENORRHAGIA WITH REGULAR CYCLE: ICD-10-CM

## 2022-01-28 RX ORDER — NORGESTIMATE AND ETHINYL ESTRADIOL 7DAYSX3 LO
KIT ORAL
Qty: 28 TABLET | Refills: 1 | Status: SHIPPED | OUTPATIENT
Start: 2022-01-28 | End: 2022-03-15

## 2022-02-07 ENCOUNTER — OFFICE VISIT (OUTPATIENT)
Dept: NEUROLOGY | Age: 37
End: 2022-02-07
Payer: COMMERCIAL

## 2022-02-07 VITALS
BODY MASS INDEX: 29.57 KG/M2 | WEIGHT: 177.5 LBS | HEIGHT: 65 IN | SYSTOLIC BLOOD PRESSURE: 134 MMHG | DIASTOLIC BLOOD PRESSURE: 88 MMHG

## 2022-02-07 DIAGNOSIS — M79.621 PAIN OF RIGHT UPPER ARM: Primary | ICD-10-CM

## 2022-02-07 DIAGNOSIS — R20.9 DISTURBANCE OF SKIN SENSATION: ICD-10-CM

## 2022-02-07 PROCEDURE — 99204 OFFICE O/P NEW MOD 45 MIN: CPT | Performed by: PSYCHIATRY & NEUROLOGY

## 2022-02-07 RX ORDER — CLINDAMYCIN PHOSPHATE 10 MG/G
GEL TOPICAL 2 TIMES DAILY
COMMUNITY
Start: 2021-08-20

## 2022-02-07 RX ORDER — FAMOTIDINE 20 MG/1
20 TABLET, FILM COATED ORAL 2 TIMES DAILY
COMMUNITY

## 2022-02-07 RX ORDER — CHLORHEXIDINE GLUCONATE 4 G/100ML
SOLUTION TOPICAL DAILY PRN
COMMUNITY
End: 2022-02-07

## 2022-02-07 NOTE — PROGRESS NOTES
[facility] 31 [facility]    Chief Complaint   Patient presents with    New Patient    Shoulder Pain     chronic right side shoulder pain; pt states after booster shot in november; pt states pain worsen at night; pt states it hard to lift arm      Visit Vitals  /88 (BP 1 Location: Left arm, BP Patient Position: Sitting, BP Cuff Size: Adult)   Ht 5' 5\" (1.651 m)   Wt 177 lb 8 oz (80.5 kg)   BMI 29.54 kg/m²

## 2022-02-07 NOTE — PROGRESS NOTES
NEUROLOGY  NEW PATIENT EVALUATION/CONSULTATION       PATIENT NAME: Eric Parsons    MRN: [id number]    REASON FOR CONSULTATION: Right shoulder pain    02/07/22      Previous records (physician notes, laboratory reports, and radiology reports) and imaging studies were reviewed and summarized. My recommendations will be communicated back to the patient's physician(s) via electronic medical record and/or by [address] mail. HISTORY OF PRESENT ILLNESS:  Eric Parsons is a 31 y.o.  female presenting for evaluation of right shoulder pain s/p Moderna booster vaccination 11/2021. She localizes pain to the proximal RUE over the deltoid where vaccine was administered. No complications following vaccination. She otherwise denies injury to the [place]. She describes pain as a tightness/soreness or throbbing into the muscle with limited RUE proximal ROM. At times, she experiences a cold/numb sensation over the area. No associated RUE weakness. She reports some intermittent cervicalgia, pre-dating onset of above symptoms. No prior PT. She is using OTC analgesics for her RUE pain typically in the evenings when pain is more severe. This is moderately helpful. PAST MEDICAL HISTORY:  Past Medical History:   Diagnosis Date    Adverse effect of anesthesia     slow to awake. slow to function    Asthma 10/20/11    CHILDHOOD; NO INHALER IN YEARS    Right groin pain 7/24/2017    Skin lesion of right lower extremity 8/7/2017       PAST SURGICAL HISTORY:  Past Surgical History:   Procedure Laterality Date    HX CYST REMOVAL  11/01/2010    inner right thigh    HX CYST REMOVAL  10/2011    HX CYST REMOVAL  8/16/13    Excision recurrent cyst, right groin    HX CYST REMOVAL  08/2017    inner right thigh    HX OTHER SURGICAL  11/10    EXCISION R GROIN CYST X2    HX OTHER SURGICAL  07/2017    wisdom teeth extraction    HX OTHER SURGICAL Right 08/23/2017    excision skin lesion right inner thigh by Dr. Ben Carlos.     HX WISDOM TEETH EXTRACTION  07/11/2017       FAMILY HISTORY:   Family History   Problem Relation Age of Onset    Asthma Mother     Hypertension Father     Diabetes Maternal Grandfather     Diabetes Paternal Grandmother     Cancer Paternal Grandfather         BRAIN TUMOR    Cancer Other         FGR GRANDFATHER-LUNG CA    Heart Disease Neg Hx     Stroke Neg Hx     Malignant Hyperthermia Neg Hx     Pseudocholinesterase Deficiency Neg Hx     Delayed Awakening Neg Hx     Post-op Nausea/Vomiting Neg Hx          SOCIAL HISTORY:  Social History     Socioeconomic History    Marital status:    Tobacco Use    Smoking status: Former Smoker     Packs/day: 0.25     Years: 0.50     Pack years: 0.12     Quit date: 8/11/2011     Years since quitting: 10.5    Smokeless tobacco: Never Used   Vaping Use    Vaping Use: Never used   Substance and Sexual Activity    Alcohol use: Yes     Comment: one time a week    Drug use: No    Sexual activity: Yes     Partners: Male     Birth control/protection: Pill         MEDICATIONS:   Current Outpatient Medications   Medication Sig Dispense Refill    clindamycin (CLINDAGEL) 1 % topical gel Apply  to affected area two (2) times a day.  famotidine (PEPCID) 20 mg tablet Take 20 mg by mouth two (2) times a day.  Tri-Lo-Sprintec 0.18/0.215/0.25 mg-25 mcg tab TAKE 1 TABLET BY MOUTH DAILY 28 Tablet 1    doxycycline (VIBRAMYCIN) 100 mg capsule Take 100 mg by mouth two (2) times a day. As needed      aspirin/acetaminophen/caffeine (EXCEDRIN MIGRAINE PO) Take  by mouth.  cholecalciferol, vitamin D3, (VITAMIN D3) 2,000 unit Tab Take 1 Tab by mouth daily.  MULTIVITAMIN PO Take  by mouth. ALLERGIES:  Allergies   Allergen Reactions    Sulfa (Sulfonamide Antibiotics) Hives    Coconut Hives         REVIEW OF SYSTEMS:  10 point ROS reviewed with patient. Please see scanned document under media.        PHYSICAL EXAM:  Vital Signs:   Visit Vitals  /88 (BP 1 Location: Left arm, BP Patient Position: Sitting, BP Cuff Size: Adult)   Ht 5' 5\" (1.651 m)   Wt 177 lb 8 oz (80.5 kg)   BMI 29.54 kg/m²        General Medical Exam:  General:  Well appearing, comfortable, in no apparent distress. Eyes/ENT: see cranial nerve examination. Neck: No masses appreciated. Full range of motion without tenderness. Respiratory:  Clear to auscultation, good air entry bilaterally. Cardiac:  Regular rate and rhythm, no murmur. GI:  Soft, non-tender, non-distended abdomen. Bowel sounds normal. No masses, organomegaly. Extremities:  No deformities, edema, or skin discoloration. Skin:  No rashes or lesions. Neurological:  · Mental Status:  Alert and oriented to person, place, and time with fluent speech. · Cranial Nerves:   CNII/III/IV/VI: visual fields full to confrontation, EOMI, PERRL, no ptosis or nystagmus. CN V: Facial sensation intact bilaterally, masseter 5/5   CN VII: Facial muscles symmetric and strong   CN VIII: Hears finger rub well bilaterally, intact vestibular function   CN IX/X: Normal palatal movement   CN XI: Full strength shoulder shrug bilaterally   CN XII: Tongue protrusion full and midline without fasciculation or atrophy  · Motor: Normal tone and muscle bulk with no pronator drift.    Individual muscle group testing:  Shoulder abduction:   Left:5/5   Right : 5/5    Shoulder adduction:   Left:5/5   Right : 5/5    Elbow flexion:      Left:5/5   Right : 5/5  Elbow extension:    Left:5/5   Right : 5/5   Wrist flexion:    Left:5/5   Right : 5/5  Wrist extension:    Left:5/5   Right : 5/5  Arm pronation:   Left:5/5   Right : 5/5  Arm supination:   Left:5/5   Right : 5/5    Finger flexion:    Left:5/5   Right : 5/5    Finger extension:   Left:5/5   Right : 5/5   Finger abduction:  Left:5/5   Right : 5/5   Finger adduction:   Left:5/5   Right : 5/5  Hip flexion:     Left:5/5   Right : 5/5         Hip extension:   Left:5/5   Right : 5/5    Knee flexion: Left:5/5   Right : 5/5    Knee extension:   Left:5/5   Right : 5/5    Dorsiflexion:     Left:5/5   Right : 5/5  Plantar flexion:    Left:5/5   Right : 5/5      · MSRs: No crossed adductors or clonus. RIGHT  LEFT   Brachioradialis 2+ 2+   Biceps 2+ 2+   Triceps 2+ 2+   Knee 2+ 2+   Achilles 2+ 2+        Plantar response Downward Downward          · Sensation: Normal and symmetric perception of pinprick, temperature, light touch, proprioception, and vibration; (-) Romberg. · Coordination: No dysmetria. Normal rapid alternating movements; finger-to-nose and heel-to- shin testing are within normal limits. · Gait: Normal native gait      ASSESSMENT:      ICD-10-CM ICD-9-CM    1. Pain of right upper arm  M79.621 729.5 EMG LIMITED      REFERRAL TO PHYSICAL THERAPY   2. Disturbance of skin sensation  R20.9 782.0 EMG LIMITED   31year old female referred for evaluation of proximal RUE pain localized to the lateral deltoid at the site of prior Moderna vaccination 11/2021. Pain is constant and worse in the evenings, largely unchanged since her injection. She experiences occasional numbness/cold sensation over the same region without associated focal weakness. Neurological examination is non-focal. Symptoms are likely related to subdeltoid inflammation/bursitis following vaccine administration, however will proceed with electrodiagnostic testing to exclude focal neuropathy or C5 radiculopathy. I have recommended she start physical therapy to address limited RUE ROM. She may benefit from localized steroid injection to the area but would defer to PCP. PLAN:  · EMG RUE  · PT for RUE  · Will discuss results of testing after procedure is completed    I have discussed the diagnosis with the patient today and the intended plan as seen in the above orders with both the patient as well as referring provider and/or PCP via electronic correspondence.  The patient has received an after-visit summary and questions were answered concerning future plans. Anne Vegas DO  Staff Neurologist  Diplomate, American Board of Psychiatry & Neurology       CC Referring provider:  Keyla Xiong [id number] [facility],  [address] [place] [address]

## 2022-03-04 ENCOUNTER — OFFICE VISIT (OUTPATIENT)
Dept: NEUROLOGY | Age: 37
End: 2022-03-04
Payer: COMMERCIAL

## 2022-03-04 DIAGNOSIS — R20.9 DISTURBANCE OF SKIN SENSATION: Primary | ICD-10-CM

## 2022-03-04 DIAGNOSIS — M79.621 PAIN OF RIGHT UPPER ARM: ICD-10-CM

## 2022-03-04 PROCEDURE — 95909 NRV CNDJ TST 5-6 STUDIES: CPT | Performed by: PSYCHIATRY & NEUROLOGY

## 2022-03-04 PROCEDURE — 95886 MUSC TEST DONE W/N TEST COMP: CPT | Performed by: PSYCHIATRY & NEUROLOGY

## 2022-03-04 NOTE — PROGRESS NOTES
[facility]  [address] [place] [address] [address]  Phone [phone] Fax [phone]  Test Date:  3/4/2022    Patient: Princess Barrios : 1985 Physician: Jose Mark. Fer Ahmadi DO   Sex: Female Height: 5' 5\" Ref Phys: Jose Mark. Fer Ahmadi DO   ID#: [id number]  Weight: 177 lbs. Technician: Chris Reece     Patient Complaints:  Right proximal upper extremity pain; disturbance of skin sensation     NCV & EMG Findings:  All nerve conduction studies were within normal limits. All F Wave latencies were within normal limits. All examined muscles (as indicated in the following table) showed no evidence of electrical instability. Impression:  Extensive electrodiagnostic examination of the right upper extremity reveals no abnormalities. In particular, there is no evidence of a right cervical motor radiculopathy. In addition, there is no evidence of a median neuropathy at or distal to the wrist (carpal tunnel syndrome). ___________________________  Dinah Ahmadi,         Nerve Conduction Studies  Anti Sensory Summary Table     Stim Site NR Peak (ms) Norm Peak (ms) P-T Amp (µV) Norm P-T Amp Onset (ms) Site1 Site2 Delta-P (ms) Dist (cm) Maged (m/s) Norm Maged (m/s)   Right Median Anti Sensory (2nd Digit)  33.2°C   Wrist    2.8 <3.6 41.5 >10 2.2 Wrist 2nd Digit 2.8 14.0 50 >39   Right Radial Anti Sensory (Base 1st Digit)  33.6°C   Wrist    1.7 <3.1 62.2  1.2 Wrist Base 1st Digit 1.7 0.0     Right Ulnar Anti Sensory (5th Digit)  33.3°C   Wrist    2.5 <3.7 69.1 >15.0 1.9 Wrist 5th Digit 2.5 14.0 56 >38     Motor Summary Table     Stim Site NR Onset (ms) Norm Onset (ms) O-P Amp (mV) Norm O-P Amp Site1 Site2 Delta-0 (ms) Dist (cm) Maged (m/s) Norm Maged (m/s)   Right Median Motor (Abd Poll Brev)  33.4°C   Wrist    2.9 <4.2 11.6 >5 Elbow Wrist 3.8 20.0 53 >50   Elbow    6.7  9.6          Right Ulnar Motor (Abd Dig Minimi) 35.2°C   Wrist    2.7 <4.2 8.8 >3 B Elbow Wrist 2.5 17.0 68 >53   B Elbow    5.2  8.3  A Elbow B Elbow 1.8 10.0 56 >53   A Elbow    7.0  7.6            Comparison Summary Table     Stim Site NR Peak (ms) Norm Peak (ms) P-T Amp (µV) Site1 Site2 Delta-P (ms) Norm Delta (ms)   Right Median/Ulnar Palm Comparison (Wrist - 8cm)  34.4°C   Median Palm    1.7 <2.5 54.5 Median Palm Ulnar Palm 0.3 <0.3   Ulnar Palm    1.4 <2.5 17.8         F Wave Studies     NR F-Lat (ms) Lat Norm (ms) L-R F-Lat (ms) L-R Lat Norm   Right Ulnar (Mrkrs) (Abd Dig Min)  35.5°C      24.42 <36  <2.5     EMG     Side Muscle Nerve Root Ins Act Fibs Psw Amp Dur Poly Recrt Int Lovette Lennert Comment   Right 1stDorInt Ulnar C8-T1 Nml Nml Nml Nml Nml 0 Nml Nml    Right FlexPolLong Median (Ant Int) C7-8 Nml Nml Nml Nml Nml 0 Nml Nml    Right Ext Indicis Radial (Post Int) C7-8 Nml Nml Nml Nml Nml 0 Nml Nml    Right Biceps Musculocut C5-6 Nml Nml Nml Nml Nml 0 Nml Nml    Right Triceps Radial C6-7-8 Nml Nml Nml Nml Nml 0 Nml Nml    Right Deltoid Axillary C5-6 Nml Nml Nml Nml Nml 0 Nml Nml    Right Cervical Parasp Low Rami C7-8 Nml Nml Nml               Waveforms:

## 2022-03-15 DIAGNOSIS — N92.0 MENORRHAGIA WITH REGULAR CYCLE: ICD-10-CM

## 2022-03-15 RX ORDER — NORGESTIMATE AND ETHINYL ESTRADIOL 7DAYSX3 LO
KIT ORAL
Qty: 28 TABLET | Refills: 1 | Status: SHIPPED | OUTPATIENT
Start: 2022-03-15 | End: 2022-04-27

## 2022-04-27 DIAGNOSIS — N92.0 MENORRHAGIA WITH REGULAR CYCLE: ICD-10-CM

## 2022-04-27 RX ORDER — NORGESTIMATE AND ETHINYL ESTRADIOL 7DAYSX3 LO
KIT ORAL
Qty: 28 TABLET | Refills: 1 | Status: SHIPPED | OUTPATIENT
Start: 2022-04-27 | End: 2022-05-02 | Stop reason: SDUPTHER

## 2022-04-29 ENCOUNTER — PATIENT MESSAGE (OUTPATIENT)
Dept: PRIMARY CARE CLINIC | Age: 37
End: 2022-04-29

## 2022-04-29 DIAGNOSIS — N92.0 MENORRHAGIA WITH REGULAR CYCLE: ICD-10-CM

## 2022-05-02 RX ORDER — NORGESTIMATE AND ETHINYL ESTRADIOL 7DAYSX3 LO
1 KIT ORAL DAILY
Qty: 28 TABLET | Refills: 1 | Status: SHIPPED | OUTPATIENT
Start: 2022-05-02 | End: 2022-06-10

## 2022-05-02 NOTE — TELEPHONE ENCOUNTER
Requested Prescriptions     Pending Prescriptions Disp Refills    norgestimate-ethinyl estradioL (Tri-Lo-Sprintec) 0.18/0.215/0.25 mg-25 mcg tab 28 Tablet 1     Sig: Take 1 Tablet by mouth daily.         Last Visit 9/9/21  Last Refill 4/27/22

## 2022-06-10 DIAGNOSIS — N92.0 MENORRHAGIA WITH REGULAR CYCLE: ICD-10-CM

## 2022-06-10 RX ORDER — NORGESTIMATE AND ETHINYL ESTRADIOL 7DAYSX3 LO
KIT ORAL
Qty: 28 TABLET | Refills: 1 | Status: SHIPPED | OUTPATIENT
Start: 2022-06-10 | End: 2022-07-27

## 2023-04-06 ENCOUNTER — TELEPHONE (OUTPATIENT)
Dept: PRIMARY CARE CLINIC | Age: 38
End: 2023-04-06

## 2023-04-07 ENCOUNTER — OFFICE VISIT (OUTPATIENT)
Dept: URGENT CARE | Age: 38
End: 2023-04-07

## 2023-04-07 NOTE — TELEPHONE ENCOUNTER
The patient contacted the answering service stating that she has tested positive for Covid-19 on 4/4.  '  Since testing positive, her symptoms have worsened. She is now experiencing worsening fever/chills, myalgia, bilateral ear pain, sinus pressure, \"burning, tonsillar pain\", and she has noticed \"some discoloration on my tonsils\". She is requesting insight if she should seek care at an urgent care. Instructed the patient to go to Urgent Care tonight. May be a candidate for Paxlovid or Prednisone taper.

## 2023-05-26 RX ORDER — FAMOTIDINE 20 MG/1
20 TABLET, FILM COATED ORAL 2 TIMES DAILY
COMMUNITY

## 2023-05-26 RX ORDER — NORGESTIMATE AND ETHINYL ESTRADIOL 7DAYSX3 LO
KIT ORAL
COMMUNITY
Start: 2022-08-30

## 2023-05-26 RX ORDER — CLINDAMYCIN PHOSPHATE 10 MG/G
GEL TOPICAL 2 TIMES DAILY
COMMUNITY
Start: 2021-08-20

## 2023-08-02 ENCOUNTER — OFFICE VISIT (OUTPATIENT)
Dept: PRIMARY CARE CLINIC | Facility: CLINIC | Age: 38
End: 2023-08-02
Payer: COMMERCIAL

## 2023-08-02 VITALS
RESPIRATION RATE: 17 BRPM | TEMPERATURE: 97.8 F | HEART RATE: 70 BPM | OXYGEN SATURATION: 95 % | DIASTOLIC BLOOD PRESSURE: 85 MMHG | WEIGHT: 197.2 LBS | SYSTOLIC BLOOD PRESSURE: 136 MMHG | HEIGHT: 65 IN | BODY MASS INDEX: 32.86 KG/M2

## 2023-08-02 DIAGNOSIS — I10 PRIMARY HYPERTENSION: Primary | ICD-10-CM

## 2023-08-02 DIAGNOSIS — G43.019 INTRACTABLE MIGRAINE WITHOUT AURA AND WITHOUT STATUS MIGRAINOSUS: ICD-10-CM

## 2023-08-02 DIAGNOSIS — N94.6 SEVERE DYSMENORRHEA: ICD-10-CM

## 2023-08-02 PROCEDURE — 99214 OFFICE O/P EST MOD 30 MIN: CPT | Performed by: INTERNAL MEDICINE

## 2023-08-02 PROCEDURE — 3079F DIAST BP 80-89 MM HG: CPT | Performed by: INTERNAL MEDICINE

## 2023-08-02 PROCEDURE — 3075F SYST BP GE 130 - 139MM HG: CPT | Performed by: INTERNAL MEDICINE

## 2023-08-02 RX ORDER — NAPROXEN 500 MG/1
500 TABLET ORAL 2 TIMES DAILY WITH MEALS
Qty: 20 TABLET | Refills: 0 | Status: SHIPPED | OUTPATIENT
Start: 2023-08-02 | End: 2023-08-22

## 2023-08-02 RX ORDER — PROPRANOLOL HYDROCHLORIDE 10 MG/1
10 TABLET ORAL 2 TIMES DAILY
Qty: 60 TABLET | Refills: 0 | Status: SHIPPED | OUTPATIENT
Start: 2023-08-02 | End: 2023-09-01

## 2023-08-02 RX ORDER — SUMATRIPTAN 50 MG/1
50 TABLET, FILM COATED ORAL
Qty: 9 TABLET | Refills: 0 | Status: SHIPPED | OUTPATIENT
Start: 2023-08-02 | End: 2023-08-02

## 2023-08-02 SDOH — ECONOMIC STABILITY: INCOME INSECURITY: HOW HARD IS IT FOR YOU TO PAY FOR THE VERY BASICS LIKE FOOD, HOUSING, MEDICAL CARE, AND HEATING?: NOT HARD AT ALL

## 2023-08-02 SDOH — ECONOMIC STABILITY: FOOD INSECURITY: WITHIN THE PAST 12 MONTHS, YOU WORRIED THAT YOUR FOOD WOULD RUN OUT BEFORE YOU GOT MONEY TO BUY MORE.: NEVER TRUE

## 2023-08-02 SDOH — ECONOMIC STABILITY: FOOD INSECURITY: WITHIN THE PAST 12 MONTHS, THE FOOD YOU BOUGHT JUST DIDN'T LAST AND YOU DIDN'T HAVE MONEY TO GET MORE.: NEVER TRUE

## 2023-08-02 SDOH — ECONOMIC STABILITY: HOUSING INSECURITY
IN THE LAST 12 MONTHS, WAS THERE A TIME WHEN YOU DID NOT HAVE A STEADY PLACE TO SLEEP OR SLEPT IN A SHELTER (INCLUDING NOW)?: NO

## 2023-08-02 ASSESSMENT — PATIENT HEALTH QUESTIONNAIRE - PHQ9
SUM OF ALL RESPONSES TO PHQ QUESTIONS 1-9: 0
2. FEELING DOWN, DEPRESSED OR HOPELESS: 0
SUM OF ALL RESPONSES TO PHQ QUESTIONS 1-9: 0
1. LITTLE INTEREST OR PLEASURE IN DOING THINGS: 0
SUM OF ALL RESPONSES TO PHQ9 QUESTIONS 1 & 2: 0

## 2023-08-02 ASSESSMENT — ENCOUNTER SYMPTOMS
RHINORRHEA: 0
CONSTIPATION: 0
COUGH: 0
CHEST TIGHTNESS: 0
BACK PAIN: 0
SORE THROAT: 0
EYE DISCHARGE: 0
DIARRHEA: 0
COLOR CHANGE: 0
SHORTNESS OF BREATH: 0
ABDOMINAL PAIN: 0

## 2023-08-02 NOTE — PROGRESS NOTES
Cristian Villanueva (: 1985) is a 30 y.o. female, established patient, here for evaluation of the following chief complaint(s):  Migraine (Monthly. ), Weight Management (Struggling to loose weight, wondering if it is due to PCOS or Endo, has not seen a GYN since 2018, would need a referral./Severe bloating ), and Hypertension    ASSESSMENT/PLAN:  Below is the assessment and plan developed based on review of pertinent history, physical exam, labs, studies, and medications. 1. Primary hypertension  -     propranolol (INDERAL) 10 MG tablet; Take 1 tablet by mouth 2 times daily, Disp-60 tablet, R-0Normal sent to pharmacy. I instructed pt to check her BP at different times of the day. She should keep a log of her readings and send it to me through [address] [place] [address]. I prescribed propranolol 10 mg to start taking daily. If her blood pressure is high, then she should start taking it BID. Potential side effects were discussed. 2. Intractable migraine without aura and without status migrainosus  -     SUMAtriptan (IMITREX) 50 MG tablet; Take 1 tablet by mouth once as needed for Migraine, Disp-9 tablet, R-0Normal sent to pharmacy. I prescribed Imitrex 50 mg mg to start taking PRN for Migraines. Potential side effects were discussed. 3. Severe dysmenorrhea  -     [facility] Dorothea Mensah MD, Ob-Gyn, [place]  ([facility])  -     naproxen (EC-NAPROSYN) 500 MG EC tablet; Take 1 tablet by mouth 2 times daily (with meals) for 20 days, Disp-20 tablet, R-0Normal sent to pharmacy. I referred her to Dr. Wai Thorne (OBGYN). I prescribed naproxen 500 mg EC to start taking PRN for menstrual pain. Potential side effects were discussed. SUBJECTIVE/OBJECTIVE:  HPI  The patient presents today for migraines, weight management, and follow up on chronic conditions. She has monthly migraines where she has to turn off lights and eliminate noise. She takes Excedrin for her migraines which is effective occasionally.  She stays

## 2023-08-10 DIAGNOSIS — N94.6 SEVERE DYSMENORRHEA: ICD-10-CM

## 2023-08-10 RX ORDER — NAPROXEN 500 MG/1
TABLET ORAL
Qty: 20 TABLET | Refills: 0 | OUTPATIENT
Start: 2023-08-10

## 2023-09-01 ENCOUNTER — OFFICE VISIT (OUTPATIENT)
Dept: PRIMARY CARE CLINIC | Facility: CLINIC | Age: 38
End: 2023-09-01
Payer: COMMERCIAL

## 2023-09-01 VITALS
OXYGEN SATURATION: 98 % | WEIGHT: 198.8 LBS | SYSTOLIC BLOOD PRESSURE: 128 MMHG | HEART RATE: 74 BPM | DIASTOLIC BLOOD PRESSURE: 80 MMHG | TEMPERATURE: 97.1 F | BODY MASS INDEX: 33.12 KG/M2 | RESPIRATION RATE: 16 BRPM | HEIGHT: 65 IN

## 2023-09-01 DIAGNOSIS — Z11.4 ENCOUNTER FOR SCREENING FOR HIV: ICD-10-CM

## 2023-09-01 DIAGNOSIS — E78.2 MIXED HYPERLIPIDEMIA: ICD-10-CM

## 2023-09-01 DIAGNOSIS — Z11.59 NEED FOR HEPATITIS C SCREENING TEST: ICD-10-CM

## 2023-09-01 DIAGNOSIS — G43.019 INTRACTABLE MIGRAINE WITHOUT AURA AND WITHOUT STATUS MIGRAINOSUS: ICD-10-CM

## 2023-09-01 DIAGNOSIS — I10 PRIMARY HYPERTENSION: Primary | ICD-10-CM

## 2023-09-01 DIAGNOSIS — I10 PRIMARY HYPERTENSION: ICD-10-CM

## 2023-09-01 PROCEDURE — 3079F DIAST BP 80-89 MM HG: CPT | Performed by: INTERNAL MEDICINE

## 2023-09-01 PROCEDURE — 3074F SYST BP LT 130 MM HG: CPT | Performed by: INTERNAL MEDICINE

## 2023-09-01 PROCEDURE — 99214 OFFICE O/P EST MOD 30 MIN: CPT | Performed by: INTERNAL MEDICINE

## 2023-09-01 RX ORDER — PROPRANOLOL HYDROCHLORIDE 10 MG/1
10 TABLET ORAL 2 TIMES DAILY
Qty: 180 TABLET | Refills: 1 | Status: SHIPPED | OUTPATIENT
Start: 2023-09-01 | End: 2024-02-28

## 2023-09-01 SDOH — ECONOMIC STABILITY: INCOME INSECURITY: HOW HARD IS IT FOR YOU TO PAY FOR THE VERY BASICS LIKE FOOD, HOUSING, MEDICAL CARE, AND HEATING?: PATIENT DECLINED

## 2023-09-01 SDOH — ECONOMIC STABILITY: HOUSING INSECURITY
IN THE LAST 12 MONTHS, WAS THERE A TIME WHEN YOU DID NOT HAVE A STEADY PLACE TO SLEEP OR SLEPT IN A SHELTER (INCLUDING NOW)?: PATIENT REFUSED

## 2023-09-01 SDOH — ECONOMIC STABILITY: FOOD INSECURITY: WITHIN THE PAST 12 MONTHS, YOU WORRIED THAT YOUR FOOD WOULD RUN OUT BEFORE YOU GOT MONEY TO BUY MORE.: PATIENT DECLINED

## 2023-09-01 SDOH — ECONOMIC STABILITY: FOOD INSECURITY: WITHIN THE PAST 12 MONTHS, THE FOOD YOU BOUGHT JUST DIDN'T LAST AND YOU DIDN'T HAVE MONEY TO GET MORE.: PATIENT DECLINED

## 2023-09-01 ASSESSMENT — ENCOUNTER SYMPTOMS
CONSTIPATION: 0
ABDOMINAL PAIN: 0
COUGH: 0
CHEST TIGHTNESS: 0
EYE DISCHARGE: 0
COLOR CHANGE: 0
RHINORRHEA: 0
SHORTNESS OF BREATH: 0
SORE THROAT: 0
DIARRHEA: 0
BACK PAIN: 0

## 2023-09-01 ASSESSMENT — PATIENT HEALTH QUESTIONNAIRE - PHQ9
SUM OF ALL RESPONSES TO PHQ QUESTIONS 1-9: 0
2. FEELING DOWN, DEPRESSED OR HOPELESS: 0
1. LITTLE INTEREST OR PLEASURE IN DOING THINGS: 0
SUM OF ALL RESPONSES TO PHQ9 QUESTIONS 1 & 2: 0
SUM OF ALL RESPONSES TO PHQ QUESTIONS 1-9: 0

## 2023-09-01 NOTE — PROGRESS NOTES
Marc Khoury (:  1985) is a 30 y.o. female, Established patient, here for evaluation of the following chief complaint(s):  Follow-up         ASSESSMENT/PLAN:  1. Primary hypertension  -     CBC; Future  -     Comprehensive Metabolic Panel; Future  -     propranolol (INDERAL) 10 MG tablet; Take 1 tablet by mouth 2 times daily, Disp-180 tablet, R-1Normal sent to pharmacy. I ordered a CBC and a CMP. I recommend that she continue taking propanolol 10 mg BID. I refilled her prescription. 2. Intractable migraine without aura and without status migrainosus  -     TSH; Future  I ordered blood work to check her TSH. I recommend that she continue taking propanolol 10 mg BID. I refilled her prescription. She can take Imitrex 50 mg PRN. 3. Mixed hyperlipidemia  -     Lipid Panel; Future  I ordered a lipid panel. 4. Need for hepatitis C screening test  -     Hepatitis C Antibody; Future  Ordered Hepatitis C test. Waiting for results. 5. Encounter for screening for HIV  -     HIV 1/2 Ag/Ab, 4TH Generation,W Rflx Confirm; Future  I ordered blood work to check for HIV. Subjective   SUBJECTIVE/OBJECTIVE:  HPI    Patient presents today for a follow up on Blood pressure. She is fasting today. Pt's BP is today in office at 125/84, 128/80 on manual repeat in left arm while sitting down. She states that she has been checking her BP regularly and notes that her BP runs 129-134/85-97. She takes propanolol 10 mg BID. She denies tiredness or fatigue. She takes propanolol 10 mg BID for migraine headaches. She has not needed to take Imitrex. She states that she is active and walks at least 10k steps daily. She states that she has a family history of HLD. She has not yet made an appointment with Gynecology.          Current Outpatient Medications on File Prior to Visit   Medication Sig Dispense Refill    ZINC PO Take by mouth      Ascorbic Acid (VITAMIN C PO) Take by mouth

## 2023-11-29 ENCOUNTER — TELEPHONE (OUTPATIENT)
Dept: PRIMARY CARE CLINIC | Facility: CLINIC | Age: 38
End: 2023-11-29

## 2023-11-29 NOTE — TELEPHONE ENCOUNTER
Called and spoke with the patient. Started yesterday, woke up with a really sore throat, feels like sand paper. Last night took a covid test- negative results. No other s/s. ONLY sore throat.  Seeking advice or possible appointment   Said if needed she understands- she can go to [facility]

## 2023-11-29 NOTE — TELEPHONE ENCOUNTER
----- Message from Miriam Gresham sent at 11/29/2023 12:15 PM EST -----  Subject: Appointment Request    Reason for Call: Established Patient Appointment needed: Semi-Routine   Cough, Cold Symptoms    QUESTIONS    Reason for appointment request? No appointments available during search     Additional Information for Provider? Pt. called and woke up with a sore   throat and would like to been seen. No appt.  Please call her.  ---------------------------------------------------------------------------  --------------  Ana Kaufman [facility]  [phone]; OK to leave message on voicemail  ---------------------------------------------------------------------------  --------------  SCRIPT ANSWERS

## 2023-11-29 NOTE — TELEPHONE ENCOUNTER
Called and spoke with patient- told her to salt water gargle and take med per dr Bañuelos Half for 2 days

## 2024-03-08 DIAGNOSIS — I10 PRIMARY HYPERTENSION: ICD-10-CM

## 2024-03-08 RX ORDER — PROPRANOLOL HYDROCHLORIDE 10 MG/1
10 TABLET ORAL 2 TIMES DAILY
Qty: 180 TABLET | Refills: 1 | Status: SHIPPED | OUTPATIENT
Start: 2024-03-08

## 2024-06-06 ENCOUNTER — TELEPHONE (OUTPATIENT)
Age: 39
End: 2024-06-06

## 2024-06-06 DIAGNOSIS — G43.019 INTRACTABLE MIGRAINE WITHOUT AURA AND WITHOUT STATUS MIGRAINOSUS: ICD-10-CM

## 2024-06-06 RX ORDER — SUMATRIPTAN 50 MG/1
TABLET, FILM COATED ORAL
Qty: 9 TABLET | Refills: 0 | Status: SHIPPED | OUTPATIENT
Start: 2024-06-06

## 2024-06-26 ENCOUNTER — TELEPHONE (OUTPATIENT)
Age: 39
End: 2024-06-26

## 2024-07-30 NOTE — TELEPHONE ENCOUNTER
From: Steve Galicia  To: Je Biswas MD  Sent: 4/29/2022 12:11 PM EDT  Subject: Prescription refill pharmacy location    [facility] [address]. Cony Irwin,     Just wanted to follow up on the automated message for my prescription refill. It says its sent to a pharmacy in [place]? Can that get updated to [facility] on [address] [facility] [address] [facility] in [place], [place] please? Thanks!    Joby Atkins No (0)

## 2024-07-31 DIAGNOSIS — I10 PRIMARY HYPERTENSION: ICD-10-CM

## 2024-07-31 RX ORDER — PROPRANOLOL HYDROCHLORIDE 10 MG/1
10 TABLET ORAL 2 TIMES DAILY
Qty: 180 TABLET | Refills: 1 | Status: SHIPPED | OUTPATIENT
Start: 2024-07-31

## 2024-12-27 DIAGNOSIS — I10 PRIMARY HYPERTENSION: ICD-10-CM

## 2024-12-27 RX ORDER — PROPRANOLOL HYDROCHLORIDE 10 MG/1
10 TABLET ORAL 2 TIMES DAILY
Qty: 60 TABLET | Refills: 0 | Status: SHIPPED | OUTPATIENT
Start: 2024-12-27

## 2024-12-30 DIAGNOSIS — I10 PRIMARY HYPERTENSION: ICD-10-CM

## 2024-12-30 RX ORDER — PROPRANOLOL HYDROCHLORIDE 10 MG/1
10 TABLET ORAL 2 TIMES DAILY
Qty: 60 TABLET | Refills: 0 | Status: SHIPPED | OUTPATIENT
Start: 2024-12-30

## 2025-01-23 DIAGNOSIS — I10 PRIMARY HYPERTENSION: ICD-10-CM

## 2025-01-23 RX ORDER — PROPRANOLOL HYDROCHLORIDE 10 MG/1
TABLET ORAL
Qty: 60 TABLET | Refills: 0 | OUTPATIENT
Start: 2025-01-23

## 2025-02-13 DIAGNOSIS — I10 PRIMARY HYPERTENSION: ICD-10-CM

## 2025-02-13 RX ORDER — PROPRANOLOL HYDROCHLORIDE 10 MG/1
TABLET ORAL
Qty: 60 TABLET | Refills: 0 | OUTPATIENT
Start: 2025-02-13

## 2025-04-07 SDOH — ECONOMIC STABILITY: FOOD INSECURITY: WITHIN THE PAST 12 MONTHS, YOU WORRIED THAT YOUR FOOD WOULD RUN OUT BEFORE YOU GOT MONEY TO BUY MORE.: NEVER TRUE

## 2025-04-07 SDOH — ECONOMIC STABILITY: INCOME INSECURITY: IN THE LAST 12 MONTHS, WAS THERE A TIME WHEN YOU WERE NOT ABLE TO PAY THE MORTGAGE OR RENT ON TIME?: NO

## 2025-04-07 SDOH — ECONOMIC STABILITY: TRANSPORTATION INSECURITY
IN THE PAST 12 MONTHS, HAS THE LACK OF TRANSPORTATION KEPT YOU FROM MEDICAL APPOINTMENTS OR FROM GETTING MEDICATIONS?: NO

## 2025-04-07 SDOH — ECONOMIC STABILITY: FOOD INSECURITY: WITHIN THE PAST 12 MONTHS, THE FOOD YOU BOUGHT JUST DIDN'T LAST AND YOU DIDN'T HAVE MONEY TO GET MORE.: NEVER TRUE

## 2025-04-07 SDOH — ECONOMIC STABILITY: TRANSPORTATION INSECURITY
IN THE PAST 12 MONTHS, HAS LACK OF TRANSPORTATION KEPT YOU FROM MEETINGS, WORK, OR FROM GETTING THINGS NEEDED FOR DAILY LIVING?: NO

## 2025-04-08 ENCOUNTER — OFFICE VISIT (OUTPATIENT)
Dept: PRIMARY CARE CLINIC | Facility: CLINIC | Age: 40
End: 2025-04-08
Payer: COMMERCIAL

## 2025-04-08 VITALS
WEIGHT: 195.8 LBS | HEART RATE: 65 BPM | TEMPERATURE: 98.5 F | RESPIRATION RATE: 16 BRPM | SYSTOLIC BLOOD PRESSURE: 118 MMHG | BODY MASS INDEX: 32.62 KG/M2 | HEIGHT: 65 IN | OXYGEN SATURATION: 99 % | DIASTOLIC BLOOD PRESSURE: 83 MMHG

## 2025-04-08 DIAGNOSIS — G43.019 INTRACTABLE MIGRAINE WITHOUT AURA AND WITHOUT STATUS MIGRAINOSUS: ICD-10-CM

## 2025-04-08 DIAGNOSIS — E66.811 OBESITY (BMI 30.0-34.9): ICD-10-CM

## 2025-04-08 DIAGNOSIS — Z11.59 NEED FOR HEPATITIS B SCREENING TEST: ICD-10-CM

## 2025-04-08 DIAGNOSIS — Z00.00 PHYSICAL EXAM: Primary | ICD-10-CM

## 2025-04-08 DIAGNOSIS — Z12.4 CERVICAL CANCER SCREENING: ICD-10-CM

## 2025-04-08 DIAGNOSIS — Z13.1 SCREENING FOR DIABETES MELLITUS: ICD-10-CM

## 2025-04-08 DIAGNOSIS — Z00.00 PHYSICAL EXAM: ICD-10-CM

## 2025-04-08 DIAGNOSIS — Z11.59 NEED FOR HEPATITIS C SCREENING TEST: ICD-10-CM

## 2025-04-08 DIAGNOSIS — Z23 NEED FOR TETANUS BOOSTER: ICD-10-CM

## 2025-04-08 DIAGNOSIS — S81.852A DOG BITE OF LEFT LOWER LEG, INITIAL ENCOUNTER: ICD-10-CM
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
## 2025-04-08 DIAGNOSIS — Z11.4 ENCOUNTER FOR SCREENING FOR HIV: ICD-10-CM

## 2025-04-08 LAB
ALBUMIN SERPL-MCNC: 4.2 G/DL (ref 3.5–5)
ALBUMIN/GLOB SERPL: 1.2 (ref 1.1–2.2)
ALP SERPL-CCNC: 108 U/L (ref 45–117)
ALT SERPL-CCNC: 31 U/L (ref 12–78)
ANION GAP SERPL CALC-SCNC: 5 MMOL/L (ref 2–12)
AST SERPL-CCNC: 21 U/L (ref 15–37)
BILIRUB SERPL-MCNC: 0.5 MG/DL (ref 0.2–1)
BUN SERPL-MCNC: 15 MG/DL (ref 6–20)
BUN/CREAT SERPL: 17 (ref 12–20)
CALCIUM SERPL-MCNC: 9.4 MG/DL (ref 8.5–10.1)
CHLORIDE SERPL-SCNC: 105 MMOL/L (ref 97–108)
CHOLEST SERPL-MCNC: 218 MG/DL
CO2 SERPL-SCNC: 28 MMOL/L (ref 21–32)
CREAT SERPL-MCNC: 0.89 MG/DL (ref 0.55–1.02)
ERYTHROCYTE [DISTWIDTH] IN BLOOD BY AUTOMATED COUNT: 12.6 % (ref 11.5–14.5)
EST. AVERAGE GLUCOSE BLD GHB EST-MCNC: 97 MG/DL
GLOBULIN SER CALC-MCNC: 3.4 G/DL (ref 2–4)
GLUCOSE SERPL-MCNC: 77 MG/DL (ref 65–100)
HBA1C MFR BLD: 5 % (ref 4–5.6)
HBV SURFACE AB SER QL: NONREACTIVE
HBV SURFACE AB SER-ACNC: <3.1 MIU/ML
HCT VFR BLD AUTO: 42.6 % (ref 35–47)
HCV AB SER IA-ACNC: 0.05 INDEX
HCV AB SERPL QL IA: NONREACTIVE
HDLC SERPL-MCNC: 57 MG/DL
HDLC SERPL: 3.8 (ref 0–5)
HGB BLD-MCNC: 14 G/DL (ref 11.5–16)
HIV 1+2 AB+HIV1 P24 AG SERPL QL IA: NONREACTIVE
HIV 1/2 RESULT COMMENT: NORMAL
LDLC SERPL CALC-MCNC: 135.8 MG/DL (ref 0–100)
MCH RBC QN AUTO: 28.9 PG (ref 26–34)
MCHC RBC AUTO-ENTMCNC: 32.9 G/DL (ref 30–36.5)
MCV RBC AUTO: 87.8 FL (ref 80–99)
NRBC # BLD: 0 K/UL (ref 0–0.01)
NRBC BLD-RTO: 0 PER 100 WBC
PLATELET # BLD AUTO: 247 K/UL (ref 150–400)
PMV BLD AUTO: 10.8 FL (ref 8.9–12.9)
POTASSIUM SERPL-SCNC: 4.4 MMOL/L (ref 3.5–5.1)
PROT SERPL-MCNC: 7.6 G/DL (ref 6.4–8.2)
RBC # BLD AUTO: 4.85 M/UL (ref 3.8–5.2)
SODIUM SERPL-SCNC: 138 MMOL/L (ref 136–145)
TRIGL SERPL-MCNC: 126 MG/DL
TSH SERPL DL<=0.05 MIU/L-ACNC: 0.44 UIU/ML (ref 0.36–3.74)
VLDLC SERPL CALC-MCNC: 25.2 MG/DL
WBC # BLD AUTO: 6.1 K/UL (ref 3.6–11)

## 2025-04-08 PROCEDURE — 99395 PREV VISIT EST AGE 18-39: CPT | Performed by: INTERNAL MEDICINE

## 2025-04-08 PROCEDURE — 90715 TDAP VACCINE 7 YRS/> IM: CPT | Performed by: INTERNAL MEDICINE

## 2025-04-08 PROCEDURE — 90471 IMMUNIZATION ADMIN: CPT | Performed by: INTERNAL MEDICINE

## 2025-04-08 PROCEDURE — 99213 OFFICE O/P EST LOW 20 MIN: CPT | Performed by: INTERNAL MEDICINE

## 2025-04-08 RX ORDER — PROPRANOLOL HYDROCHLORIDE 10 MG/1
10 TABLET ORAL 2 TIMES DAILY
Qty: 60 TABLET | Refills: 3 | Status: SHIPPED | OUTPATIENT
Start: 2025-04-08

## 2025-04-08 RX ORDER — SUMATRIPTAN 50 MG/1
50 TABLET, FILM COATED ORAL
Qty: 9 TABLET | Refills: 2 | Status: SHIPPED | OUTPATIENT
Start: 2025-04-08

## 2025-04-08 ASSESSMENT — PATIENT HEALTH QUESTIONNAIRE - PHQ9
1. LITTLE INTEREST OR PLEASURE IN DOING THINGS: NOT AT ALL
SUM OF ALL RESPONSES TO PHQ QUESTIONS 1-9: 0
2. FEELING DOWN, DEPRESSED OR HOPELESS: NOT AT ALL
SUM OF ALL RESPONSES TO PHQ QUESTIONS 1-9: 0

## 2025-04-08 ASSESSMENT — ENCOUNTER SYMPTOMS
SHORTNESS OF BREATH: 0
BACK PAIN: 0
CHEST TIGHTNESS: 0
CONSTIPATION: 0
RHINORRHEA: 0
DIARRHEA: 0
EYE DISCHARGE: 0
ABDOMINAL PAIN: 0
COLOR CHANGE: 0
SORE THROAT: 0
COUGH: 0

## 2025-04-08 NOTE — PROGRESS NOTES
Health Decision Maker has been checked with the patient   Primary Decision Maker: Shashi Hughes - Spouse - [phone]     Patient has stated that the scribe can come in room    Chief Complaint   Patient presents with    Annual Exam     Recent dog bite- week ago on the knee       \"Have you been to the ER, urgent care clinic since your last visit?  Hospitalized since your last visit?\"    NO    “Have you seen or consulted any other health care providers outside of [facility] since your last visit?”    NO      Vitals:    04/08/25 1145   BP: 118/83   BP Site: Left Upper Arm   Patient Position: Sitting   BP Cuff Size: Large Adult   Pulse: 65   Resp: 16   Temp: 98.5 °F (36.9 °C)   TempSrc: Temporal   SpO2: 99%   Weight: 88.8 kg (195 lb 12.8 oz)   Height: 1.651 m (5' 5\")      Depression: Not at risk (4/8/2025)    PHQ-2     PHQ-2 Score: 0              Click Here for Release of Records Request    Chart reviewed: immunizations are documented.   Immunization History   Administered Date(s) Administered    COVID-19, MODERNA BLUE border, Primary or Immunocompromised, (age 12y+), IM, 100 mcg/0.5mL 03/22/2021, 04/20/2021, 10/22/2021    HPV (Human Papilloma Virus)Vaccine 09/06/2012, 11/06/2012    HPV Quadrivalent (Gardasil) 03/05/2013    Influenza Virus Vaccine 09/21/2020      
Patient provided with most updated VIS prior to administration. Opportunity given for questions and concerns provided. No concerns at this time    Immunizations administered to patient 4/8/2025 by Miriam Posey LPN with consent.Patient tolerated procedure well. No reactions noted.  
Grandfather         BRAIN TUMOR       Social History     Socioeconomic History    Marital status:      Spouse name: Not on file    Number of children: Not on file    Years of education: Not on file    Highest education level: Not on file   Occupational History    Not on file   Tobacco Use    Smoking status: Former     Current packs/day: 0.00     Types: Cigarettes     Quit date: 2011     Years since quittin.6    Smokeless tobacco: Never   Vaping Use    Vaping status: Never Used   Substance and Sexual Activity    Alcohol use: Not Currently     Comment: occ    Drug use: Yes     Types: Marijuana (Weed)     Comment: rare- occ    Sexual activity: Not Currently     Birth control/protection: Pill   Other Topics Concern    Not on file   Social History Narrative    Not on file     Social Drivers of Health     Financial Resource Strain: Patient Declined (2023)    Overall Financial Resource Strain (CARDIA)     Difficulty of Paying Living Expenses: Patient declined   Food Insecurity: No Food Insecurity (2025)    Hunger Vital Sign     Worried About Running Out of Food in the Last Year: Never true     Ran Out of Food in the Last Year: Never true   Transportation Needs: No Transportation Needs (2025)    PRAPARE - Transportation     Lack of Transportation (Medical): No     Lack of Transportation (Non-Medical): No   Physical Activity: Not on file   Stress: Not on file   Social Connections: Not on file   Intimate Partner Violence: Not on file   Housing Stability: Low Risk  (2025)    Housing Stability Vital Sign     Unable to Pay for Housing in the Last Year: No     Number of Times Moved in the Last Year: 0     Homeless in the Last Year: No       No visits with results within 3 Month(s) from this visit.   Latest known visit with results is:   Orders Only on 2021   Component Date Value Ref Range Status    Cholesterol, Total 2021 226 (H)  100 - 199 mg/dL Final    Triglycerides 2021 142

## 2025-04-10 ENCOUNTER — RESULTS FOLLOW-UP (OUTPATIENT)
Dept: PRIMARY CARE CLINIC | Facility: CLINIC | Age: 40
End: 2025-04-10

## 2025-04-16 ENCOUNTER — TELEPHONE (OUTPATIENT)
Age: 40
End: 2025-04-16

## 2025-04-16 NOTE — TELEPHONE ENCOUNTER
Called patient to scheduled an appointment from the referral we received, left a VM if the patient is still interested in seening the doctor to give us a call back.

## 2025-07-02 ENCOUNTER — TELEPHONE (OUTPATIENT)
Age: 40
End: 2025-07-02

## 2025-07-02 NOTE — TELEPHONE ENCOUNTER
Called patient to let her know that her appt have to be r/s due to the changes with the provider schedule, left VM Dr. Ugarte next available would be in October and if she wants something sooner it would be with the NP and to give us a call back to schedule.

## 2025-07-30 NOTE — PROGRESS NOTES
I called and notified the patient and she verbally understood. Nora Hughes is a 39 y.o. female for an annual exam     Chief Complaint   Patient presents with    Annual Exam         Problems: has been off birth control for 2 years after being consistently on birth control for around 22 years and states since being off the birth control has been experiencing painful cycles with heavy bleeding. States that she is not looking to get back on birth control, would like to further discuss, naproxen has not helped with pain for cramps       Ob/Gyn Hx:  No obstetric history on file.   LMP - Patient's last menstrual period was 07/25/2025 (exact date).  Her periods are heavy in flow and usually regular with a 26-32 day interval with 3-7 day duration.  She has dysmenorrhea.  Contraception - none   Hx of STI - no  SA - yes           Health maintenance:  Last Pap: 02/3/2021 NIL   HPV Vaccine: yes         1. Have you been to the ER, urgent care clinic, or hospitalized since your last visit? New pt   2. Have you seen or consulted any other health care providers outside of the [facility] since your last visit? New pt       She declines a chaperone during the gynecologic exam today.

## 2025-08-01 ENCOUNTER — OFFICE VISIT (OUTPATIENT)
Age: 40
End: 2025-08-01
Payer: COMMERCIAL

## 2025-08-01 VITALS
BODY MASS INDEX: 29.99 KG/M2 | RESPIRATION RATE: 16 BRPM | DIASTOLIC BLOOD PRESSURE: 82 MMHG | HEART RATE: 65 BPM | OXYGEN SATURATION: 97 % | WEIGHT: 180 LBS | TEMPERATURE: 97.8 F | SYSTOLIC BLOOD PRESSURE: 123 MMHG | HEIGHT: 65 IN

## 2025-08-01 DIAGNOSIS — N94.6 DYSMENORRHEA: ICD-10-CM

## 2025-08-01 DIAGNOSIS — R10.2 PELVIC PAIN: Primary | ICD-10-CM

## 2025-08-01 DIAGNOSIS — N92.0 MENORRHAGIA WITH REGULAR CYCLE: ICD-10-CM

## 2025-08-01 PROCEDURE — 99203 OFFICE O/P NEW LOW 30 MIN: CPT | Performed by: OBSTETRICS & GYNECOLOGY

## 2025-08-01 RX ORDER — ACETAMINOPHEN AND CODEINE PHOSPHATE 120; 12 MG/5ML; MG/5ML
1 SOLUTION ORAL DAILY
Qty: 84 TABLET | Refills: 4 | Status: SHIPPED | OUTPATIENT
Start: 2025-08-01

## 2025-08-01 NOTE — PROGRESS NOTES
Problem Visit    Nora Hughes is a 39 y.o. presenting for discuss painful heavy menses. Mom with h/o endometriosis, wonders if she may have this as well.     Was on hormonal birth control (OCPs/patch/IUD) x 22 year, then got off 2 years ago, and since then she has had painful cycles. Stopped hormonal contraception due to mood fluctuations (was on triphasic for many years). Since discontinuation she has had increasingly worse cramping, dyspareunia and dyschezia at times.    Previously getting pap smears with PCP, Dr. Silver. Bad experience with Ob/Gyn in the past who would not remove her IUD until she had an XR of her spine (as she was having back pain) so hasn't seen GYN since 2017.    Ob/Gyn Hx:  G0  LMP-Patient's last menstrual period was 07/25/2025 (exact date). Just finishing her cycle.  Menses- Regular, monthly, 3-7 days in length, heavy flow and significant cramping  Contraception-none currently  STI- HPV 2013  ?SA-yes, male partner    Health maintenance:  Pap-2021 NILM HPV neg, HPV in 2012, normal paps since  Gardasil - completed  Mammo- age 42    Past Medical History:   Diagnosis Date    Adverse effect of anesthesia     slow to awake. slow to function    Asthma 10/20/11    CHILDHOOD; NO INHALER IN YEARS    Right groin pain 7/24/2017    Skin lesion of right lower extremity 8/7/2017       Past Surgical History:   Procedure Laterality Date    CYST REMOVAL  08/2017    inner right thigh    CYST REMOVAL  11/01/2010    inner right thigh    CYST REMOVAL  10/2011    CYST REMOVAL  8/16/13    Excision recurrent cyst, right groin    OTHER SURGICAL HISTORY Right 08/23/2017    excision skin lesion right inner thigh by Dr. Rafael Weeks.    OTHER SURGICAL HISTORY  07/2017    wisdom teeth extraction    OTHER SURGICAL HISTORY  11/10    EXCISION R GROIN CYST X2    WISDOM TOOTH EXTRACTION  07/11/2017       Family History   Problem Relation Age of Onset    Stroke Neg Hx     Diabetes Paternal Grandmother     Diabetes Maternal

## (undated) DEVICE — NEEDLE HYPO 25GA L1.5IN BVL ORIENTED ECLIPSE

## (undated) DEVICE — SURGICAL PROCEDURE PACK BASIN MAJ SET CUST NO CAUT

## (undated) DEVICE — STERILE POLYISOPRENE POWDER-FREE SURGICAL GLOVES: Brand: PROTEXIS

## (undated) DEVICE — BASIC PACK: Brand: CONVERTORS

## (undated) DEVICE — DERMABOND SKIN ADH 0.7ML -- DERMABOND ADVANCED 12/BX

## (undated) DEVICE — KENDALL SCD EXPRESS SLEEVES, KNEE LENGTH, MEDIUM: Brand: KENDALL SCD

## (undated) DEVICE — INTENDED FOR TISSUE SEPARATION, AND OTHER PROCEDURES THAT REQUIRE A SHARP SURGICAL BLADE TO PUNCTURE OR CUT.: Brand: BARD-PARKER ® CARBON RIB-BACK BLADES

## (undated) DEVICE — REM POLYHESIVE ADULT PATIENT RETURN ELECTRODE: Brand: VALLEYLAB

## (undated) DEVICE — DRAPE,LAPAROTOMY,T,PEDI,STERILE: Brand: MEDLINE

## (undated) DEVICE — INFECTION CONTROL KIT SYS

## (undated) DEVICE — SUTURE VCRL SZ 2-0 L27IN ABSRB UD L26MM SH 1/2 CIR J417H

## (undated) DEVICE — SOLUTION IV 1000ML 0.9% SOD CHL

## (undated) DEVICE — TOWEL SURG W17XL27IN STD BLU COT NONFENESTRATED PREWASHED

## (undated) DEVICE — (D)SYR 10ML 1/5ML GRAD NSAF -- PKGING CHANGE USE ITEM 338027

## (undated) DEVICE — TRAY PREP DRY W/ PREM GLV 2 APPL 6 SPNG 2 UNDPD 1 OVERWRAP

## (undated) DEVICE — SUTURE MCRYL SZ 4-0 L27IN ABSRB UD L19MM PS-2 1/2 CIR PRIM Y426H

## (undated) DEVICE — ROCKER SWITCH PENCIL BLADE ELECTRODE, HOLSTER: Brand: EDGE